# Patient Record
Sex: FEMALE | Race: BLACK OR AFRICAN AMERICAN | NOT HISPANIC OR LATINO | Employment: UNEMPLOYED | URBAN - METROPOLITAN AREA
[De-identification: names, ages, dates, MRNs, and addresses within clinical notes are randomized per-mention and may not be internally consistent; named-entity substitution may affect disease eponyms.]

---

## 2020-02-24 ENCOUNTER — HOSPITAL ENCOUNTER (EMERGENCY)
Facility: HOSPITAL | Age: 25
Discharge: HOME | End: 2020-02-24
Attending: EMERGENCY MEDICINE | Admitting: EMERGENCY MEDICINE

## 2020-02-24 ENCOUNTER — APPOINTMENT (OUTPATIENT)
Dept: CT IMAGING | Facility: HOSPITAL | Age: 25
End: 2020-02-24

## 2020-02-24 VITALS
OXYGEN SATURATION: 100 % | DIASTOLIC BLOOD PRESSURE: 69 MMHG | WEIGHT: 200 LBS | SYSTOLIC BLOOD PRESSURE: 109 MMHG | BODY MASS INDEX: 35.44 KG/M2 | HEIGHT: 63 IN | TEMPERATURE: 98 F | RESPIRATION RATE: 16 BRPM | HEART RATE: 64 BPM

## 2020-02-24 DIAGNOSIS — R10.84 GENERALIZED ABDOMINAL PAIN: Primary | ICD-10-CM

## 2020-02-24 DIAGNOSIS — K59.00 CONSTIPATION, UNSPECIFIED CONSTIPATION TYPE: ICD-10-CM

## 2020-02-24 LAB
ALBUMIN SERPL-MCNC: 4.3 G/DL (ref 3.5–5.2)
ALBUMIN/GLOB SERPL: 1.2 G/DL
ALP SERPL-CCNC: 60 U/L (ref 39–117)
ALT SERPL W P-5'-P-CCNC: 6 U/L (ref 1–33)
ANION GAP SERPL CALCULATED.3IONS-SCNC: 11 MMOL/L (ref 5–15)
AST SERPL-CCNC: 13 U/L (ref 1–32)
BACTERIA UR QL AUTO: NORMAL /HPF
BASOPHILS # BLD AUTO: 0.02 10*3/MM3 (ref 0–0.2)
BASOPHILS NFR BLD AUTO: 0.5 % (ref 0–1.5)
BILIRUB SERPL-MCNC: 0.4 MG/DL (ref 0.2–1.2)
BILIRUB UR QL STRIP: NEGATIVE
BUN BLD-MCNC: 7 MG/DL (ref 6–20)
BUN/CREAT SERPL: 10.8 (ref 7–25)
CALCIUM SPEC-SCNC: 9.4 MG/DL (ref 8.6–10.5)
CHLORIDE SERPL-SCNC: 103 MMOL/L (ref 98–107)
CLARITY UR: CLEAR
CLUE CELLS SPEC QL WET PREP: NORMAL
CO2 SERPL-SCNC: 24 MMOL/L (ref 22–29)
COLOR UR: YELLOW
CREAT BLD-MCNC: 0.65 MG/DL (ref 0.57–1)
DEPRECATED RDW RBC AUTO: 42.5 FL (ref 37–54)
EOSINOPHIL # BLD AUTO: 0.03 10*3/MM3 (ref 0–0.4)
EOSINOPHIL NFR BLD AUTO: 0.7 % (ref 0.3–6.2)
ERYTHROCYTE [DISTWIDTH] IN BLOOD BY AUTOMATED COUNT: 14.5 % (ref 12.3–15.4)
GFR SERPL CREATININE-BSD FRML MDRD: 137 ML/MIN/1.73
GLOBULIN UR ELPH-MCNC: 3.5 GM/DL
GLUCOSE BLD-MCNC: 96 MG/DL (ref 65–99)
GLUCOSE UR STRIP-MCNC: NEGATIVE MG/DL
HCG INTACT+B SERPL-ACNC: <0.5 MIU/ML
HCT VFR BLD AUTO: 37.5 % (ref 34–46.6)
HGB BLD-MCNC: 11.8 G/DL (ref 12–15.9)
HGB UR QL STRIP.AUTO: NEGATIVE
HYALINE CASTS UR QL AUTO: NORMAL /LPF
HYDATID CYST SPEC WET PREP: NORMAL
IMM GRANULOCYTES # BLD AUTO: 0 10*3/MM3 (ref 0–0.05)
IMM GRANULOCYTES NFR BLD AUTO: 0 % (ref 0–0.5)
KETONES UR QL STRIP: NEGATIVE
KOH PREP NAIL: NORMAL
LEUKOCYTE ESTERASE UR QL STRIP.AUTO: ABNORMAL
LYMPHOCYTES # BLD AUTO: 1.08 10*3/MM3 (ref 0.7–3.1)
LYMPHOCYTES NFR BLD AUTO: 24.7 % (ref 19.6–45.3)
MCH RBC QN AUTO: 25.7 PG (ref 26.6–33)
MCHC RBC AUTO-ENTMCNC: 31.5 G/DL (ref 31.5–35.7)
MCV RBC AUTO: 81.7 FL (ref 79–97)
MONOCYTES # BLD AUTO: 0.3 10*3/MM3 (ref 0.1–0.9)
MONOCYTES NFR BLD AUTO: 6.9 % (ref 5–12)
NEUTROPHILS # BLD AUTO: 2.94 10*3/MM3 (ref 1.7–7)
NEUTROPHILS NFR BLD AUTO: 67.2 % (ref 42.7–76)
NITRITE UR QL STRIP: NEGATIVE
NRBC BLD AUTO-RTO: 0 /100 WBC (ref 0–0.2)
PH UR STRIP.AUTO: 8.5 [PH] (ref 5–8)
PLATELET # BLD AUTO: 226 10*3/MM3 (ref 140–450)
PMV BLD AUTO: 9.8 FL (ref 6–12)
POTASSIUM BLD-SCNC: 4.2 MMOL/L (ref 3.5–5.2)
PROT SERPL-MCNC: 7.8 G/DL (ref 6–8.5)
PROT UR QL STRIP: NEGATIVE
RBC # BLD AUTO: 4.59 10*6/MM3 (ref 3.77–5.28)
RBC # UR: NORMAL /HPF
REF LAB TEST METHOD: NORMAL
SODIUM BLD-SCNC: 138 MMOL/L (ref 136–145)
SP GR UR STRIP: 1.01 (ref 1–1.03)
SQUAMOUS #/AREA URNS HPF: NORMAL /HPF
T VAGINALIS SPEC QL WET PREP: NORMAL
UROBILINOGEN UR QL STRIP: ABNORMAL
WBC NRBC COR # BLD: 4.37 10*3/MM3 (ref 3.4–10.8)
WBC SPEC QL WET PREP: NORMAL
WBC UR QL AUTO: NORMAL /HPF
YEAST GENITAL QL WET PREP: NORMAL

## 2020-02-24 PROCEDURE — 87210 SMEAR WET MOUNT SALINE/INK: CPT | Performed by: EMERGENCY MEDICINE

## 2020-02-24 PROCEDURE — 99284 EMERGENCY DEPT VISIT MOD MDM: CPT

## 2020-02-24 PROCEDURE — 87220 TISSUE EXAM FOR FUNGI: CPT | Performed by: EMERGENCY MEDICINE

## 2020-02-24 PROCEDURE — 85025 COMPLETE CBC W/AUTO DIFF WBC: CPT | Performed by: EMERGENCY MEDICINE

## 2020-02-24 PROCEDURE — 74176 CT ABD & PELVIS W/O CONTRAST: CPT

## 2020-02-24 PROCEDURE — 84702 CHORIONIC GONADOTROPIN TEST: CPT | Performed by: EMERGENCY MEDICINE

## 2020-02-24 PROCEDURE — 87491 CHLMYD TRACH DNA AMP PROBE: CPT | Performed by: EMERGENCY MEDICINE

## 2020-02-24 PROCEDURE — 87591 N.GONORRHOEAE DNA AMP PROB: CPT | Performed by: EMERGENCY MEDICINE

## 2020-02-24 PROCEDURE — 81001 URINALYSIS AUTO W/SCOPE: CPT | Performed by: EMERGENCY MEDICINE

## 2020-02-24 PROCEDURE — 80053 COMPREHEN METABOLIC PANEL: CPT | Performed by: EMERGENCY MEDICINE

## 2020-02-24 RX ORDER — SODIUM CHLORIDE 0.9 % (FLUSH) 0.9 %
10 SYRINGE (ML) INJECTION AS NEEDED
Status: DISCONTINUED | OUTPATIENT
Start: 2020-02-24 | End: 2020-02-24 | Stop reason: HOSPADM

## 2020-02-24 RX ORDER — ACETAMINOPHEN 500 MG
1000 TABLET ORAL ONCE
Status: DISCONTINUED | OUTPATIENT
Start: 2020-02-24 | End: 2020-02-24

## 2020-02-24 RX ORDER — MAGNESIUM CARB/ALUMINUM HYDROX 105-160MG
296 TABLET,CHEWABLE ORAL ONCE
Status: DISCONTINUED | OUTPATIENT
Start: 2020-02-24 | End: 2020-02-24

## 2020-02-24 RX ORDER — DICYCLOMINE HYDROCHLORIDE 10 MG/1
20 CAPSULE ORAL ONCE
Status: DISCONTINUED | OUTPATIENT
Start: 2020-02-24 | End: 2020-02-24

## 2020-02-24 NOTE — ED PROVIDER NOTES
Subjective   Bailee Lora is a 24 y.o.female who presents to the emergency department with complaints of vaginal pain. The patient reports she believes she is pregnant, despite presenting to her OBGYN prior to her presentation to the ED where she was told she was not pregnant. She states she believes she is pregnant due to constant vaginal pain described as aching for one week, and that this is similar to the sensation she experienced during a previous pregnancy one year ago. She endorses vaginal bleeding secondary to her menstrual cycle which ended yesterday and was normal. Associated symptoms include nausea, abdominal pain, constipation, and cough. Her last bowel movement was two days ago and occurred easily, however she states this is atypical for her as she usually experiences a bowel movement daily. She denies vomiting, diarrhea, dysuria, urinary frequency, fever, and rash. The patient has a medical history of Lupus. Her social history includes recreational marijuana use, but she denies alcohol and tobacco use. There are no other acute complaints at this time.      History provided by:  Patient  Pelvic Pain   Location:  Vaginal pain  Severity:  Moderate  Onset quality:  Sudden  Duration:  1 week  Timing:  Constant  Progression:  Unchanged  Chronicity:  Recurrent  Context:  Experienced this pain during her previous pregnancy  Associated symptoms: abdominal pain, cough and nausea    Associated symptoms: no diarrhea, no fever, no rash and no vomiting        Review of Systems   Constitutional: Negative for fever.   Respiratory: Positive for cough.    Gastrointestinal: Positive for abdominal pain, constipation and nausea. Negative for diarrhea and vomiting.   Genitourinary: Positive for pelvic pain, vaginal bleeding (resolved) and vaginal pain. Negative for dysuria, frequency and menstrual problem.   Skin: Negative for rash.   All other systems reviewed and are negative.      Past Medical History:   Diagnosis  Date   • Lupus (CMS/HCC)    • Vaginal delivery        Allergies   Allergen Reactions   • Amoxicillin    • Biaxin [Clarithromycin]    • Erythromycin    • Penicillins        Past Surgical History:   Procedure Laterality Date   • DILATATION AND CURETTAGE     • INNER EAR SURGERY     • MOUTH SURGERY         History reviewed. No pertinent family history.    Social History     Socioeconomic History   • Marital status: Single     Spouse name: Not on file   • Number of children: Not on file   • Years of education: Not on file   • Highest education level: Not on file   Tobacco Use   • Smoking status: Never Smoker   Substance and Sexual Activity   • Alcohol use: No   • Drug use: Yes     Types: Marijuana         Objective   Physical Exam   Constitutional: She is oriented to person, place, and time. She appears well-developed and well-nourished. No distress.   HENT:   Head: Normocephalic and atraumatic.   Eyes: Conjunctivae are normal. No scleral icterus.   Neck: Normal range of motion. Neck supple.   Cardiovascular: Normal rate, regular rhythm, normal heart sounds and intact distal pulses.   Pulmonary/Chest: Effort normal and breath sounds normal. No respiratory distress.   Abdominal: Soft. Bowel sounds are normal. There is tenderness. There is no CVA tenderness.   Patient with no response to abdominal exam stethoscope and palpation but then when asked about tenderness states it was tender. No CVA tenderness   Genitourinary: Vagina normal. There is no rash or lesion on the right labia. There is no rash or lesion on the left labia. Cervix exhibits no motion tenderness and no discharge. No bleeding in the vagina. No foreign body in the vagina. No signs of injury around the vagina. No vaginal discharge found.   Musculoskeletal: Normal range of motion.   Neurological: She is alert and oriented to person, place, and time.   Skin: Skin is warm and dry.   Psychiatric: She has a normal mood and affect. Her behavior is normal.   Nursing  note and vitals reviewed.      Procedures         ED Course  ED Course as of Feb 24 1526   Mon Feb 24, 2020   0914 The patient was brought back to the room with concern for possible contractions in pregnancy.  I did a quick bedside screening ultrasound which demonstrated no evidence of intrauterine pregnancy.  Patient had a distended bladder and a large uterus but no intrauterine pregnancy was visualized.    [RS]   1007 HCG Quantitative: <0.50 [RS]   1121 I advised the patient on the findings of her lab and imaging studies.  No emergent or surgical findings.  Patient does have moderate constipation and fecal stasis.  I talked with the patient about resolution of these but the patient is refusing any medications for this.  I discussed that they could help with her symptoms and help her feel better.  However, the patient continues to refuse.  I advised her to drink plenty of fluids for hydration and to increase bowel regularity. I had a discussion with the patient/family regarding diagnosis, diagnostic results, treatment plan, and medications.  The patient/family indicated understanding of these instructions.  I spent adequate time at the bedside proceeding discharge necessary to personally discuss the aftercare instructions, giving patient education, providing explanations of the results of our evaluations/findings, and my decision making to assure that the patient/family understand the plan of care.  Time was allotted to answer questions at that time and throughout the ED course.  Emphasis was placed on timely follow-up after discharge.  I also discussed the potential for the development of an acute emergent condition requiring further evaluation, admission, or even surgical intervention. I discussed that we found nothing during the visit today indicating the need for further workup, admission, or the presence of an unstable medical condition.  I encouraged the patient to return to the emergency department  immediately for ANY concerns, worsening, new complaints, or if symptoms persist and unable to seek follow-up in a timely fashion.  The patient/family expressed understanding and agreement with this plan.     [RS]      ED Course User Index  [RS] Sami Araya MD     Recent Results (from the past 24 hour(s))   Comprehensive Metabolic Panel    Collection Time: 02/24/20  9:23 AM   Result Value Ref Range    Glucose 96 65 - 99 mg/dL    BUN 7 6 - 20 mg/dL    Creatinine 0.65 0.57 - 1.00 mg/dL    Sodium 138 136 - 145 mmol/L    Potassium 4.2 3.5 - 5.2 mmol/L    Chloride 103 98 - 107 mmol/L    CO2 24.0 22.0 - 29.0 mmol/L    Calcium 9.4 8.6 - 10.5 mg/dL    Total Protein 7.8 6.0 - 8.5 g/dL    Albumin 4.30 3.50 - 5.20 g/dL    ALT (SGPT) 6 1 - 33 U/L    AST (SGOT) 13 1 - 32 U/L    Alkaline Phosphatase 60 39 - 117 U/L    Total Bilirubin 0.4 0.2 - 1.2 mg/dL    eGFR  African Amer 137 >60 mL/min/1.73    Globulin 3.5 gm/dL    A/G Ratio 1.2 g/dL    BUN/Creatinine Ratio 10.8 7.0 - 25.0    Anion Gap 11.0 5.0 - 15.0 mmol/L   hCG, Quantitative, Pregnancy    Collection Time: 02/24/20  9:23 AM   Result Value Ref Range    HCG Quantitative <0.50 mIU/mL   Urinalysis With Culture If Indicated - Urine, Clean Catch    Collection Time: 02/24/20  9:23 AM   Result Value Ref Range    Color, UA Yellow Yellow, Straw    Appearance, UA Clear Clear    pH, UA 8.5 (H) 5.0 - 8.0    Specific Gravity, UA 1.007 1.001 - 1.030    Glucose, UA Negative Negative    Ketones, UA Negative Negative    Bilirubin, UA Negative Negative    Blood, UA Negative Negative    Protein, UA Negative Negative    Leuk Esterase, UA Trace (A) Negative    Nitrite, UA Negative Negative    Urobilinogen, UA 0.2 E.U./dL 0.2 - 1.0 E.U./dL   CBC Auto Differential    Collection Time: 02/24/20  9:23 AM   Result Value Ref Range    WBC 4.37 3.40 - 10.80 10*3/mm3    RBC 4.59 3.77 - 5.28 10*6/mm3    Hemoglobin 11.8 (L) 12.0 - 15.9 g/dL    Hematocrit 37.5 34.0 - 46.6 %    MCV 81.7 79.0 - 97.0  fL    MCH 25.7 (L) 26.6 - 33.0 pg    MCHC 31.5 31.5 - 35.7 g/dL    RDW 14.5 12.3 - 15.4 %    RDW-SD 42.5 37.0 - 54.0 fl    MPV 9.8 6.0 - 12.0 fL    Platelets 226 140 - 450 10*3/mm3    Neutrophil % 67.2 42.7 - 76.0 %    Lymphocyte % 24.7 19.6 - 45.3 %    Monocyte % 6.9 5.0 - 12.0 %    Eosinophil % 0.7 0.3 - 6.2 %    Basophil % 0.5 0.0 - 1.5 %    Immature Grans % 0.0 0.0 - 0.5 %    Neutrophils, Absolute 2.94 1.70 - 7.00 10*3/mm3    Lymphocytes, Absolute 1.08 0.70 - 3.10 10*3/mm3    Monocytes, Absolute 0.30 0.10 - 0.90 10*3/mm3    Eosinophils, Absolute 0.03 0.00 - 0.40 10*3/mm3    Basophils, Absolute 0.02 0.00 - 0.20 10*3/mm3    Immature Grans, Absolute 0.00 0.00 - 0.05 10*3/mm3    nRBC 0.0 0.0 - 0.2 /100 WBC   Urinalysis, Microscopic Only - Urine, Clean Catch    Collection Time: 02/24/20  9:23 AM   Result Value Ref Range    RBC, UA 0-2 None Seen, 0-2 /HPF    WBC, UA 0-2 None Seen, 0-2 /HPF    Bacteria, UA None Seen None Seen, Trace /HPF    Squamous Epithelial Cells, UA 0-2 None Seen, 0-2 /HPF    Hyaline Casts, UA None Seen 0 - 6 /LPF    Methodology Automated Microscopy    CHECO Prep - Swab, Vagina    Collection Time: 02/24/20  9:35 AM   Result Value Ref Range    KOH Prep No yeast or hyphal elements seen No yeast or hyphal elements seen   Wet Prep, Genital - Swab, Vagina    Collection Time: 02/24/20  9:35 AM   Result Value Ref Range    YEAST No yeast seen No yeast seen    HYPHAL ELEMENTS No Hyphal elements seen No Hyphal elements seen    WBC'S No WBC's seen No WBC's seen    Clue Cells, Wet Prep No Clue cells seen No Clue cells seen    Trichomonas, Wet Prep No Trichomonas seen No Trichomonas seen     Note: In addition to lab results from this visit, the labs listed above may include labs taken at another facility or during a different encounter within the last 24 hours. Please correlate lab times with ED admission and discharge times for further clarification of the services performed during this visit.    CT Abdomen  "Pelvis Without Contrast   Preliminary Result   1.  Mild-to-moderate fecal stasis.   2.  No evidence of acute inflammatory process or other clearly acute   intra-abdominal or intrapelvic disease is seen. If the patient's   symptoms persist or worsen, consider followup with oral and IV contrast   enhanced scan.       D:  02/24/2020   E:  02/24/2020                Vitals:    02/24/20 0914 02/24/20 0937 02/24/20 1000 02/24/20 1030   BP: 132/81 121/79  109/69   Pulse: 71  65 64   Resp: 16      Temp: 98 °F (36.7 °C)      TempSrc: Oral      SpO2: 100%  98% 100%   Weight: 90.7 kg (200 lb)      Height: 160 cm (63\")        Medications - No data to display  ECG/EMG Results (last 24 hours)     ** No results found for the last 24 hours. **        No orders to display                                                  MDM  Number of Diagnoses or Management Options  Constipation, unspecified constipation type:   Generalized abdominal pain:      Amount and/or Complexity of Data Reviewed  Clinical lab tests: reviewed  Tests in the radiology section of CPT®: reviewed  Independent visualization of images, tracings, or specimens: yes        Final diagnoses:   Generalized abdominal pain   Constipation, unspecified constipation type       Documentation assistance provided by camila Kim.  Information recorded by the scribe was done at my direction and has been verified and validated by me.     Royce Kim  02/24/20 1129       Sami Araya MD  02/24/20 1527    "

## 2020-02-25 LAB
C TRACH RRNA SPEC DONR QL NAA+PROBE: NEGATIVE
N GONORRHOEA DNA SPEC QL NAA+PROBE: NEGATIVE

## 2023-09-14 ENCOUNTER — APPOINTMENT (OUTPATIENT)
Dept: CT IMAGING | Facility: HOSPITAL | Age: 28
End: 2023-09-14
Payer: MEDICAID

## 2023-09-14 ENCOUNTER — HOSPITAL ENCOUNTER (EMERGENCY)
Facility: HOSPITAL | Age: 28
Discharge: HOME | End: 2023-09-14
Attending: EMERGENCY MEDICINE
Payer: MEDICAID

## 2023-09-14 VITALS
OXYGEN SATURATION: 100 % | BODY MASS INDEX: 36.32 KG/M2 | TEMPERATURE: 98.5 F | RESPIRATION RATE: 18 BRPM | SYSTOLIC BLOOD PRESSURE: 103 MMHG | HEART RATE: 78 BPM | DIASTOLIC BLOOD PRESSURE: 71 MMHG | HEIGHT: 63 IN | WEIGHT: 205 LBS

## 2023-09-14 DIAGNOSIS — K59.00 CONSTIPATION, UNSPECIFIED CONSTIPATION TYPE: Primary | ICD-10-CM

## 2023-09-14 DIAGNOSIS — N39.0 URINARY TRACT INFECTION WITHOUT HEMATURIA, SITE UNSPECIFIED: ICD-10-CM

## 2023-09-14 LAB
ALBUMIN SERPL-MCNC: 3.9 G/DL (ref 3.5–5.2)
ALBUMIN/GLOB SERPL: 1.1 G/DL
ALP SERPL-CCNC: 59 U/L (ref 39–117)
ALT SERPL W P-5'-P-CCNC: 7 U/L (ref 1–33)
AMPHET+METHAMPHET UR QL: NEGATIVE
AMPHETAMINES UR QL: NEGATIVE
ANION GAP SERPL CALCULATED.3IONS-SCNC: 8 MMOL/L (ref 5–15)
AST SERPL-CCNC: 16 U/L (ref 1–32)
BACTERIA UR QL AUTO: ABNORMAL /HPF
BARBITURATES UR QL SCN: NEGATIVE
BASOPHILS # BLD AUTO: 0.02 10*3/MM3 (ref 0–0.2)
BASOPHILS NFR BLD AUTO: 0.4 % (ref 0–1.5)
BENZODIAZ UR QL SCN: NEGATIVE
BILIRUB SERPL-MCNC: 0.2 MG/DL (ref 0–1.2)
BILIRUB UR QL STRIP: NEGATIVE
BUN SERPL-MCNC: 7 MG/DL (ref 6–20)
BUN/CREAT SERPL: 8.6 (ref 7–25)
BUPRENORPHINE SERPL-MCNC: NEGATIVE NG/ML
CALCIUM SPEC-SCNC: 8.5 MG/DL (ref 8.6–10.5)
CANNABINOIDS SERPL QL: NEGATIVE
CHLORIDE SERPL-SCNC: 107 MMOL/L (ref 98–107)
CLARITY UR: ABNORMAL
CO2 SERPL-SCNC: 27 MMOL/L (ref 22–29)
COCAINE UR QL: NEGATIVE
COLOR UR: YELLOW
CREAT SERPL-MCNC: 0.81 MG/DL (ref 0.57–1)
DEPRECATED RDW RBC AUTO: 43.4 FL (ref 37–54)
EGFRCR SERPLBLD CKD-EPI 2021: 102.2 ML/MIN/1.73
EOSINOPHIL # BLD AUTO: 0.06 10*3/MM3 (ref 0–0.4)
EOSINOPHIL NFR BLD AUTO: 1.3 % (ref 0.3–6.2)
ERYTHROCYTE [DISTWIDTH] IN BLOOD BY AUTOMATED COUNT: 16.8 % (ref 12.3–15.4)
FENTANYL UR-MCNC: NEGATIVE NG/ML
GLOBULIN UR ELPH-MCNC: 3.7 GM/DL
GLUCOSE SERPL-MCNC: 89 MG/DL (ref 65–99)
GLUCOSE UR STRIP-MCNC: NEGATIVE MG/DL
HCG SERPL QL: NEGATIVE
HCT VFR BLD AUTO: 32.1 % (ref 34–46.6)
HGB BLD-MCNC: 9.7 G/DL (ref 12–15.9)
HGB UR QL STRIP.AUTO: NEGATIVE
HYALINE CASTS UR QL AUTO: ABNORMAL /LPF
IMM GRANULOCYTES # BLD AUTO: 0.01 10*3/MM3 (ref 0–0.05)
IMM GRANULOCYTES NFR BLD AUTO: 0.2 % (ref 0–0.5)
KETONES UR QL STRIP: NEGATIVE
LEUKOCYTE ESTERASE UR QL STRIP.AUTO: ABNORMAL
LIPASE SERPL-CCNC: 29 U/L (ref 13–60)
LYMPHOCYTES # BLD AUTO: 1.78 10*3/MM3 (ref 0.7–3.1)
LYMPHOCYTES NFR BLD AUTO: 38.8 % (ref 19.6–45.3)
MCH RBC QN AUTO: 22 PG (ref 26.6–33)
MCHC RBC AUTO-ENTMCNC: 30.2 G/DL (ref 31.5–35.7)
MCV RBC AUTO: 72.8 FL (ref 79–97)
METHADONE UR QL SCN: NEGATIVE
MONOCYTES # BLD AUTO: 0.27 10*3/MM3 (ref 0.1–0.9)
MONOCYTES NFR BLD AUTO: 5.9 % (ref 5–12)
NEUTROPHILS NFR BLD AUTO: 2.45 10*3/MM3 (ref 1.7–7)
NEUTROPHILS NFR BLD AUTO: 53.4 % (ref 42.7–76)
NITRITE UR QL STRIP: NEGATIVE
NRBC BLD AUTO-RTO: 0 /100 WBC (ref 0–0.2)
OPIATES UR QL: NEGATIVE
OXYCODONE UR QL SCN: NEGATIVE
PCP UR QL SCN: NEGATIVE
PH UR STRIP.AUTO: 8 [PH] (ref 5–9)
PLATELET # BLD AUTO: 183 10*3/MM3 (ref 140–450)
PMV BLD AUTO: 10.2 FL (ref 6–12)
POTASSIUM SERPL-SCNC: 4 MMOL/L (ref 3.5–5.2)
PROPOXYPH UR QL: NEGATIVE
PROT SERPL-MCNC: 7.6 G/DL (ref 6–8.5)
PROT UR QL STRIP: NEGATIVE
RBC # BLD AUTO: 4.41 10*6/MM3 (ref 3.77–5.28)
RBC # UR STRIP: ABNORMAL /HPF
REF LAB TEST METHOD: ABNORMAL
SODIUM SERPL-SCNC: 142 MMOL/L (ref 136–145)
SP GR UR STRIP: 1.02 (ref 1–1.03)
SQUAMOUS #/AREA URNS HPF: ABNORMAL /HPF
TRICYCLICS UR QL SCN: NEGATIVE
UROBILINOGEN UR QL STRIP: ABNORMAL
WBC # UR STRIP: ABNORMAL /HPF
WBC NRBC COR # BLD: 4.59 10*3/MM3 (ref 3.4–10.8)

## 2023-09-14 PROCEDURE — 74177 CT ABD & PELVIS W/CONTRAST: CPT

## 2023-09-14 PROCEDURE — 99285 EMERGENCY DEPT VISIT HI MDM: CPT

## 2023-09-14 PROCEDURE — 25510000001 IOPAMIDOL 61 % SOLUTION: Performed by: EMERGENCY MEDICINE

## 2023-09-14 PROCEDURE — 81001 URINALYSIS AUTO W/SCOPE: CPT

## 2023-09-14 PROCEDURE — 84703 CHORIONIC GONADOTROPIN ASSAY: CPT

## 2023-09-14 PROCEDURE — 80053 COMPREHEN METABOLIC PANEL: CPT

## 2023-09-14 PROCEDURE — 85025 COMPLETE CBC W/AUTO DIFF WBC: CPT

## 2023-09-14 PROCEDURE — 83690 ASSAY OF LIPASE: CPT

## 2023-09-14 PROCEDURE — 80307 DRUG TEST PRSMV CHEM ANLYZR: CPT

## 2023-09-14 RX ORDER — POLYETHYLENE GLYCOL 3350 17 G/17G
17 POWDER, FOR SOLUTION ORAL DAILY
Qty: 5 PACKET | Refills: 0 | Status: SHIPPED | OUTPATIENT
Start: 2023-09-14 | End: 2023-09-21 | Stop reason: HOSPADM

## 2023-09-14 RX ORDER — SODIUM CHLORIDE 0.9 % (FLUSH) 0.9 %
10 SYRINGE (ML) INJECTION AS NEEDED
Status: DISCONTINUED | OUTPATIENT
Start: 2023-09-14 | End: 2023-09-14 | Stop reason: HOSPADM

## 2023-09-14 RX ORDER — NITROFURANTOIN 25; 75 MG/1; MG/1
100 CAPSULE ORAL 2 TIMES DAILY
Qty: 14 CAPSULE | Refills: 0 | Status: SHIPPED | OUTPATIENT
Start: 2023-09-14 | End: 2023-09-21 | Stop reason: HOSPADM

## 2023-09-14 RX ADMIN — IOPAMIDOL 90 ML: 612 INJECTION, SOLUTION INTRAVENOUS at 20:59

## 2023-09-15 NOTE — ED PROVIDER NOTES
Subjective   History of Present Illness  28 year old female patient presents to ER for complaint of constipation for past 2 days, feeling of bloating with flatulence since onset, and epigastric pain. She is rating pain 7/10 presently. She also reports that she has lost almost 50 lbs over the past 84 days unintentionally. She denies changes in her diet or medications. She denies nausea, vomiting, urinary symptoms, or fever. LMP 8/27. She has a history of lupus and has been at [facility] for past 3 days after detoxing from marijuana. 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 She denies tobacco use, ETOH use, or present illicit drug use for past month.     Review of Systems   Constitutional:  Positive for unexpected weight change. Negative for fever.   HENT: Negative.     Eyes: Negative.    Respiratory: Negative.     Cardiovascular: Negative.    Gastrointestinal:  Positive for abdominal distention, abdominal pain and constipation. Negative for diarrhea, nausea and vomiting.   Endocrine: Negative.    Genitourinary: Negative.  Negative for dysuria and urgency.   Musculoskeletal: Negative.    Skin: Negative.    Allergic/Immunologic: Negative.    Neurological: Negative.    Hematological: Negative.    Psychiatric/Behavioral: Negative.       Past Medical History:   Diagnosis Date    Lupus     Vaginal delivery        Allergies   Allergen Reactions    Amoxicillin     Biaxin [Clarithromycin]     Erythromycin     Penicillins        Past Surgical History:   Procedure Laterality Date    DILATATION AND CURETTAGE      INNER EAR SURGERY      MOUTH SURGERY         History reviewed. No pertinent family history.    Social History     Socioeconomic History    Marital status: Single   Tobacco Use    Smoking status: Never   Substance and Sexual Activity    Alcohol use: No    Drug use: Yes     Types: Marijuana           Objective   /78   Pulse 78   Temp 98.5 °F (36.9 °C) (Oral)   Resp 18   Ht  "160 cm (63\")   Wt 93 kg (205 lb)   LMP 08/27/2023 (Exact Date)   SpO2 98%   BMI 36.31 kg/m²     Physical Exam  Vitals and nursing note reviewed.   Constitutional:       General: She is not in acute distress.     Appearance: Normal appearance. She is not ill-appearing, toxic-appearing or diaphoretic.   HENT:      Head: Normocephalic.      Nose: Nose normal.      Mouth/Throat:      Mouth: Mucous membranes are moist.   Eyes:      Pupils: Pupils are equal, round, and reactive to light.   Cardiovascular:      Rate and Rhythm: Normal rate and regular rhythm.      Pulses: Normal pulses.      Heart sounds: Normal heart sounds.   Pulmonary:      Effort: Pulmonary effort is normal. No respiratory distress.      Breath sounds: Normal breath sounds. No stridor. No wheezing or rhonchi.   Abdominal:      General: Bowel sounds are normal.      Palpations: Abdomen is soft.      Tenderness: There is generalized abdominal tenderness and tenderness in the epigastric area and suprapubic area.   Musculoskeletal:         General: Normal range of motion.      Cervical back: Normal range of motion.   Skin:     General: Skin is warm and dry.      Capillary Refill: Capillary refill takes less than 2 seconds.   Neurological:      Mental Status: She is alert and oriented to person, place, and time.   Psychiatric:         Mood and Affect: Mood normal.         Behavior: Behavior normal.         Thought Content: Thought content normal.         Judgment: Judgment normal.       Procedures           ED Course  ED Course as of 09/14/23 2121   Thu Sep 14, 2023   2101 Urinalysis With Microscopic If Indicated (No Culture) - Urine, Clean Catch(!) [HS]   2116 Spoke with patient about her results and plan for discharge including abx for UTI, miralax for constipation, and follow up with FP for reevaluation of anemia. She verbalizes understanding and is agreeable with plan. [HS]      ED Course User Index  [HS] Suzy Fuentes, CLARISSE           Results " for orders placed or performed during the hospital encounter of 09/14/23   Comprehensive Metabolic Panel    Specimen: Blood   Result Value Ref Range    Glucose 89 65 - 99 mg/dL    BUN 7 6 - 20 mg/dL    Creatinine 0.81 0.57 - 1.00 mg/dL    Sodium 142 136 - 145 mmol/L    Potassium 4.0 3.5 - 5.2 mmol/L    Chloride 107 98 - 107 mmol/L    CO2 27.0 22.0 - 29.0 mmol/L    Calcium 8.5 (L) 8.6 - 10.5 mg/dL    Total Protein 7.6 6.0 - 8.5 g/dL    Albumin 3.9 3.5 - 5.2 g/dL    ALT (SGPT) 7 1 - 33 U/L    AST (SGOT) 16 1 - 32 U/L    Alkaline Phosphatase 59 39 - 117 U/L    Total Bilirubin 0.2 0.0 - 1.2 mg/dL    Globulin 3.7 gm/dL    A/G Ratio 1.1 g/dL    BUN/Creatinine Ratio 8.6 7.0 - 25.0    Anion Gap 8.0 5.0 - 15.0 mmol/L    eGFR 102.2 >60.0 mL/min/1.73   Lipase    Specimen: Blood   Result Value Ref Range    Lipase 29 13 - 60 U/L   hCG, Serum, Qualitative    Specimen: Blood   Result Value Ref Range    HCG Qualitative Negative Negative   Urinalysis With Microscopic If Indicated (No Culture) - Urine, Clean Catch    Specimen: Urine, Clean Catch   Result Value Ref Range    Color, UA Yellow Yellow, Straw, Dark Yellow, Chika    Appearance, UA Cloudy (A) Clear    pH, UA 8.0 5.0 - 9.0    Specific Gravity, UA 1.023 1.003 - 1.030    Glucose, UA Negative Negative    Ketones, UA Negative Negative    Bilirubin, UA Negative Negative    Blood, UA Negative Negative    Protein, UA Negative Negative    Leuk Esterase, UA Large (3+) (A) Negative    Nitrite, UA Negative Negative    Urobilinogen, UA 1.0 E.U./dL 0.2 - 1.0 E.U./dL   Urine Drug Screen - Urine, Clean Catch    Specimen: Urine, Clean Catch   Result Value Ref Range    THC, Screen, Urine Negative Negative    Phencyclidine (PCP), Urine Negative Negative    Cocaine Screen, Urine Negative Negative    Methamphetamine, Ur Negative Negative    Opiate Screen Negative Negative    Amphetamine Screen, Urine Negative Negative    Benzodiazepine Screen, Urine Negative Negative    Tricyclic Antidepressants  Screen Negative Negative    Methadone Screen, Urine Negative Negative    Barbiturates Screen, Urine Negative Negative    Oxycodone Screen, Urine Negative Negative    Propoxyphene Screen Negative Negative    Buprenorphine, Screen, Urine Negative Negative   CBC Auto Differential    Specimen: Blood   Result Value Ref Range    WBC 4.59 3.40 - 10.80 10*3/mm3    RBC 4.41 3.77 - 5.28 10*6/mm3    Hemoglobin 9.7 (L) 12.0 - 15.9 g/dL    Hematocrit 32.1 (L) 34.0 - 46.6 %    MCV 72.8 (L) 79.0 - 97.0 fL    MCH 22.0 (L) 26.6 - 33.0 pg    MCHC 30.2 (L) 31.5 - 35.7 g/dL    RDW 16.8 (H) 12.3 - 15.4 %    RDW-SD 43.4 37.0 - 54.0 fl    MPV 10.2 6.0 - 12.0 fL    Platelets 183 140 - 450 10*3/mm3    Neutrophil % 53.4 42.7 - 76.0 %    Lymphocyte % 38.8 19.6 - 45.3 %    Monocyte % 5.9 5.0 - 12.0 %    Eosinophil % 1.3 0.3 - 6.2 %    Basophil % 0.4 0.0 - 1.5 %    Immature Grans % 0.2 0.0 - 0.5 %    Neutrophils, Absolute 2.45 1.70 - 7.00 10*3/mm3    Lymphocytes, Absolute 1.78 0.70 - 3.10 10*3/mm3    Monocytes, Absolute 0.27 0.10 - 0.90 10*3/mm3    Eosinophils, Absolute 0.06 0.00 - 0.40 10*3/mm3    Basophils, Absolute 0.02 0.00 - 0.20 10*3/mm3    Immature Grans, Absolute 0.01 0.00 - 0.05 10*3/mm3    nRBC 0.0 0.0 - 0.2 /100 WBC   Fentanyl, Urine - Urine, Clean Catch    Specimen: Urine, Clean Catch   Result Value Ref Range    Fentanyl, Urine Negative Negative   Urinalysis, Microscopic Only - Urine, Clean Catch    Specimen: Urine, Clean Catch   Result Value Ref Range    RBC, UA None Seen None Seen /HPF    WBC, UA 21-30 (A) None Seen, 0-2, 3-5 /HPF    Bacteria, UA 3+ (A) None Seen /HPF    Squamous Epithelial Cells, UA 6-12 (A) None Seen, 0-2 /HPF    Hyaline Casts, UA None Seen None Seen /LPF    Methodology Manual Light Microscopy      CT Abdomen Pelvis With Contrast    Result Date: 9/14/2023  Indication: Epigastric pain with constipation and unintentional weight loss TECHNIQUE: Intravenous contrast was administered and axial images from the level  of the diaphragms through the pelvis were performed followed by 2-D multiplanar reformats. Comparison: None FINDINGS: Streak artifact from a morbidly obese patient body habitus compromises evaluation diffusely.  Abdominal viscera are unremarkable.  No ascites or free air or bowel obstruction is seen.  Moderate to large fecal residue in the colon may be from constipation.  In the subcutaneous fat of the right buttock soft tissue thickening along with specks of air may be secondary to recent/recurrent subcutaneous injections.  Visualized lung bases and the bones show no significant abnormality.     No definite acute abnormality seen.                                     Medical Decision Making  Amount and/or Complexity of Data Reviewed  Labs: ordered.  Radiology: ordered.    Risk  Prescription drug management.        Final diagnoses:   Constipation, unspecified constipation type   Urinary tract infection without hematuria, site unspecified       ED Disposition  ED Disposition       ED Disposition   Discharge    Condition   Stable    Comment   --               BDM  RESIDENT [facility]  [address] [place] [address] [phone]  Call   ER follow up in 2-3 days for reevaluation of symptoms         Medication List        New Prescriptions      nitrofurantoin (macrocrystal-monohydrate) 100 MG capsule  Commonly known as: MACROBID  Take 1 capsule by mouth 2 (Two) Times a Day for 7 days.     polyethylene glycol 17 g packet  Commonly known as: MIRALAX  Take 17 g by mouth Daily for 5 days.               Where to Get Your Medications        These medications were sent to [place], [place] - [facility] [phone] [facility] [phone] [address] [address]      Phone: [phone]   nitrofurantoin (macrocrystal-monohydrate) 100 MG capsule  polyethylene glycol 17 g packet            Suzy Fuentes, APRN  09/14/23 [id number]

## 2023-09-15 NOTE — DISCHARGE INSTRUCTIONS
Home to rest. Increase fluids and fiber intake. Take antibiotics as prescribed. Take miralax for next 5 days as prescribed. Follow up with primary care provider for reevaluation of symptoms, may use number provided to establish care. Return to ER for worsening symptoms.

## 2023-09-16 ENCOUNTER — HOSPITAL ENCOUNTER (OUTPATIENT)
Facility: HOSPITAL | Age: 28
Setting detail: OBSERVATION
Discharge: HOME | End: 2023-09-21
Attending: STUDENT IN AN ORGANIZED HEALTH CARE EDUCATION/TRAINING PROGRAM | Admitting: INTERNAL MEDICINE

## 2023-09-16 DIAGNOSIS — R45.89 SUICIDAL BEHAVIOR WITHOUT ATTEMPTED SELF-INJURY: Primary | ICD-10-CM

## 2023-09-16 DIAGNOSIS — U07.1 COVID: ICD-10-CM

## 2023-09-16 DIAGNOSIS — R44.0 AUDITORY HALLUCINATION: ICD-10-CM

## 2023-09-16 LAB
ALBUMIN SERPL-MCNC: 4.3 G/DL (ref 3.5–5.2)
ALBUMIN/GLOB SERPL: 1.3 G/DL
ALP SERPL-CCNC: 70 U/L (ref 39–117)
ALT SERPL W P-5'-P-CCNC: 25 U/L (ref 1–33)
AMPHET+METHAMPHET UR QL: NEGATIVE
AMPHETAMINES UR QL: NEGATIVE
ANION GAP SERPL CALCULATED.3IONS-SCNC: 15 MMOL/L (ref 5–15)
APAP SERPL-MCNC: <5 MCG/ML (ref 0–30)
AST SERPL-CCNC: 48 U/L (ref 1–32)
BACTERIA UR QL AUTO: ABNORMAL /HPF
BARBITURATES UR QL SCN: NEGATIVE
BASOPHILS # BLD AUTO: 0.04 10*3/MM3 (ref 0–0.2)
BASOPHILS NFR BLD AUTO: 0.9 % (ref 0–1.5)
BENZODIAZ UR QL SCN: NEGATIVE
BILIRUB SERPL-MCNC: 0.3 MG/DL (ref 0–1.2)
BILIRUB UR QL STRIP: NEGATIVE
BUN SERPL-MCNC: 6 MG/DL (ref 6–20)
BUN/CREAT SERPL: 6.4 (ref 7–25)
BUPRENORPHINE SERPL-MCNC: NEGATIVE NG/ML
CALCIUM SPEC-SCNC: 8.7 MG/DL (ref 8.6–10.5)
CANNABINOIDS SERPL QL: NEGATIVE
CHLORIDE SERPL-SCNC: 101 MMOL/L (ref 98–107)
CLARITY UR: ABNORMAL
CO2 SERPL-SCNC: 21 MMOL/L (ref 22–29)
COCAINE UR QL: NEGATIVE
COLOR UR: YELLOW
CREAT SERPL-MCNC: 0.94 MG/DL (ref 0.57–1)
DEPRECATED RDW RBC AUTO: 42.5 FL (ref 37–54)
EGFRCR SERPLBLD CKD-EPI 2021: 85.5 ML/MIN/1.73
EOSINOPHIL # BLD AUTO: 0.05 10*3/MM3 (ref 0–0.4)
EOSINOPHIL NFR BLD AUTO: 1.1 % (ref 0.3–6.2)
ERYTHROCYTE [DISTWIDTH] IN BLOOD BY AUTOMATED COUNT: 16.6 % (ref 12.3–15.4)
ETHANOL BLD-MCNC: <10 MG/DL (ref 0–10)
ETHANOL UR QL: <0.01 %
FENTANYL UR-MCNC: NEGATIVE NG/ML
FINE GRAN CASTS URNS QL MICRO: ABNORMAL /LPF
FLUAV RNA RESP QL NAA+PROBE: NOT DETECTED
FLUBV RNA RESP QL NAA+PROBE: NOT DETECTED
GLOBULIN UR ELPH-MCNC: 3.4 GM/DL
GLUCOSE SERPL-MCNC: 102 MG/DL (ref 65–99)
GLUCOSE UR STRIP-MCNC: NEGATIVE MG/DL
HCT VFR BLD AUTO: 34 % (ref 34–46.6)
HGB BLD-MCNC: 10.5 G/DL (ref 12–15.9)
HGB UR QL STRIP.AUTO: ABNORMAL
HOLD SPECIMEN: NORMAL
HOLD SPECIMEN: NORMAL
HYALINE CASTS UR QL AUTO: ABNORMAL /LPF
IMM GRANULOCYTES # BLD AUTO: 0.01 10*3/MM3 (ref 0–0.05)
IMM GRANULOCYTES NFR BLD AUTO: 0.2 % (ref 0–0.5)
KETONES UR QL STRIP: NEGATIVE
LEUKOCYTE ESTERASE UR QL STRIP.AUTO: ABNORMAL
LYMPHOCYTES # BLD AUTO: 2.68 10*3/MM3 (ref 0.7–3.1)
LYMPHOCYTES NFR BLD AUTO: 57 % (ref 19.6–45.3)
MCH RBC QN AUTO: 22 PG (ref 26.6–33)
MCHC RBC AUTO-ENTMCNC: 30.9 G/DL (ref 31.5–35.7)
MCV RBC AUTO: 71.3 FL (ref 79–97)
METHADONE UR QL SCN: NEGATIVE
MONOCYTES # BLD AUTO: 0.42 10*3/MM3 (ref 0.1–0.9)
MONOCYTES NFR BLD AUTO: 8.9 % (ref 5–12)
NEUTROPHILS NFR BLD AUTO: 1.5 10*3/MM3 (ref 1.7–7)
NEUTROPHILS NFR BLD AUTO: 31.9 % (ref 42.7–76)
NITRITE UR QL STRIP: NEGATIVE
NRBC BLD AUTO-RTO: 0 /100 WBC (ref 0–0.2)
OPIATES UR QL: NEGATIVE
OXYCODONE UR QL SCN: NEGATIVE
PCP UR QL SCN: NEGATIVE
PH UR STRIP.AUTO: 7 [PH] (ref 5–9)
PLATELET # BLD AUTO: 237 10*3/MM3 (ref 140–450)
PMV BLD AUTO: 10.7 FL (ref 6–12)
POTASSIUM SERPL-SCNC: 3.3 MMOL/L (ref 3.5–5.2)
PROPOXYPH UR QL: NEGATIVE
PROT SERPL-MCNC: 7.7 G/DL (ref 6–8.5)
PROT UR QL STRIP: ABNORMAL
RBC # BLD AUTO: 4.77 10*6/MM3 (ref 3.77–5.28)
RBC # UR STRIP: ABNORMAL /HPF
REF LAB TEST METHOD: ABNORMAL
SALICYLATES SERPL-MCNC: <0.3 MG/DL
SARS-COV-2 RNA RESP QL NAA+PROBE: DETECTED
SODIUM SERPL-SCNC: 137 MMOL/L (ref 136–145)
SP GR UR STRIP: 1.01 (ref 1–1.03)
SQUAMOUS #/AREA URNS HPF: ABNORMAL /HPF
TRICYCLICS UR QL SCN: NEGATIVE
UROBILINOGEN UR QL STRIP: ABNORMAL
WBC # UR STRIP: ABNORMAL /HPF
WBC NRBC COR # BLD: 4.7 10*3/MM3 (ref 3.4–10.8)
WHOLE BLOOD HOLD COAG: NORMAL
WHOLE BLOOD HOLD SPECIMEN: NORMAL

## 2023-09-16 PROCEDURE — 87636 SARSCOV2 & INF A&B AMP PRB: CPT | Performed by: STUDENT IN AN ORGANIZED HEALTH CARE EDUCATION/TRAINING PROGRAM

## 2023-09-16 PROCEDURE — 80307 DRUG TEST PRSMV CHEM ANLYZR: CPT | Performed by: STUDENT IN AN ORGANIZED HEALTH CARE EDUCATION/TRAINING PROGRAM

## 2023-09-16 PROCEDURE — 80143 DRUG ASSAY ACETAMINOPHEN: CPT | Performed by: STUDENT IN AN ORGANIZED HEALTH CARE EDUCATION/TRAINING PROGRAM

## 2023-09-16 PROCEDURE — 82077 ASSAY SPEC XCP UR&BREATH IA: CPT | Performed by: STUDENT IN AN ORGANIZED HEALTH CARE EDUCATION/TRAINING PROGRAM

## 2023-09-16 PROCEDURE — 80053 COMPREHEN METABOLIC PANEL: CPT | Performed by: STUDENT IN AN ORGANIZED HEALTH CARE EDUCATION/TRAINING PROGRAM

## 2023-09-16 PROCEDURE — 80179 DRUG ASSAY SALICYLATE: CPT | Performed by: STUDENT IN AN ORGANIZED HEALTH CARE EDUCATION/TRAINING PROGRAM

## 2023-09-16 PROCEDURE — 99285 EMERGENCY DEPT VISIT HI MDM: CPT

## 2023-09-16 PROCEDURE — 85025 COMPLETE CBC W/AUTO DIFF WBC: CPT | Performed by: STUDENT IN AN ORGANIZED HEALTH CARE EDUCATION/TRAINING PROGRAM

## 2023-09-16 PROCEDURE — 81001 URINALYSIS AUTO W/SCOPE: CPT | Performed by: STUDENT IN AN ORGANIZED HEALTH CARE EDUCATION/TRAINING PROGRAM

## 2023-09-17 PROBLEM — U07.1 LAB TEST POSITIVE FOR DETECTION OF COVID-19 VIRUS: Status: ACTIVE | Noted: 2023-09-17

## 2023-09-17 PROBLEM — F33.2 SEVERE EPISODE OF RECURRENT MAJOR DEPRESSIVE DISORDER: Status: ACTIVE | Noted: 2023-09-17

## 2023-09-17 PROBLEM — R45.89 SUICIDAL BEHAVIOR: Status: ACTIVE | Noted: 2023-09-17

## 2023-09-17 PROBLEM — F20.9 SCHIZOPHRENIA: Status: ACTIVE | Noted: 2023-09-17

## 2023-09-17 LAB
D-LACTATE SERPL-SCNC: 1 MMOL/L (ref 0.5–2)
PROCALCITONIN SERPL-MCNC: 0.06 NG/ML (ref 0–0.25)
QT INTERVAL: 378 MS
QT INTERVAL: 394 MS
QTC INTERVAL: 427 MS
QTC INTERVAL: 434 MS

## 2023-09-17 PROCEDURE — 84145 PROCALCITONIN (PCT): CPT | Performed by: INTERNAL MEDICINE

## 2023-09-17 PROCEDURE — 87040 BLOOD CULTURE FOR BACTERIA: CPT | Performed by: INTERNAL MEDICINE

## 2023-09-17 PROCEDURE — G0378 HOSPITAL OBSERVATION PER HR: HCPCS

## 2023-09-17 PROCEDURE — 93010 ELECTROCARDIOGRAM REPORT: CPT | Performed by: INTERNAL MEDICINE

## 2023-09-17 PROCEDURE — 93005 ELECTROCARDIOGRAM TRACING: CPT | Performed by: INTERNAL MEDICINE

## 2023-09-17 PROCEDURE — 83605 ASSAY OF LACTIC ACID: CPT | Performed by: INTERNAL MEDICINE

## 2023-09-17 PROCEDURE — 99245 OFF/OP CONSLTJ NEW/EST HI 55: CPT

## 2023-09-17 RX ORDER — BISACODYL 5 MG/1
5 TABLET, DELAYED RELEASE ORAL DAILY PRN
Status: DISCONTINUED | OUTPATIENT
Start: 2023-09-17 | End: 2023-09-21 | Stop reason: HOSPADM

## 2023-09-17 RX ORDER — ESCITALOPRAM OXALATE 5 MG/1
5 TABLET ORAL DAILY
Status: DISCONTINUED | OUTPATIENT
Start: 2023-09-17 | End: 2023-09-19

## 2023-09-17 RX ORDER — MORPHINE SULFATE 2 MG/ML
2 INJECTION, SOLUTION INTRAMUSCULAR; INTRAVENOUS EVERY 4 HOURS PRN
Status: DISCONTINUED | OUTPATIENT
Start: 2023-09-17 | End: 2023-09-21 | Stop reason: HOSPADM

## 2023-09-17 RX ORDER — ACETAMINOPHEN 325 MG/1
650 TABLET ORAL EVERY 4 HOURS PRN
Status: DISCONTINUED | OUTPATIENT
Start: 2023-09-17 | End: 2023-09-21 | Stop reason: HOSPADM

## 2023-09-17 RX ORDER — ENOXAPARIN SODIUM 100 MG/ML
40 INJECTION SUBCUTANEOUS DAILY
Status: DISCONTINUED | OUTPATIENT
Start: 2023-09-17 | End: 2023-09-21 | Stop reason: HOSPADM

## 2023-09-17 RX ORDER — ACETAMINOPHEN 650 MG/1
650 SUPPOSITORY RECTAL EVERY 4 HOURS PRN
Status: DISCONTINUED | OUTPATIENT
Start: 2023-09-17 | End: 2023-09-21 | Stop reason: HOSPADM

## 2023-09-17 RX ORDER — SODIUM CHLORIDE 9 MG/ML
40 INJECTION, SOLUTION INTRAVENOUS AS NEEDED
Status: DISCONTINUED | OUTPATIENT
Start: 2023-09-17 | End: 2023-09-21 | Stop reason: HOSPADM

## 2023-09-17 RX ORDER — SODIUM CHLORIDE 0.9 % (FLUSH) 0.9 %
10 SYRINGE (ML) INJECTION EVERY 12 HOURS SCHEDULED
Status: DISCONTINUED | OUTPATIENT
Start: 2023-09-17 | End: 2023-09-21 | Stop reason: HOSPADM

## 2023-09-17 RX ORDER — ACETAMINOPHEN 160 MG/5ML
650 SOLUTION ORAL EVERY 4 HOURS PRN
Status: DISCONTINUED | OUTPATIENT
Start: 2023-09-17 | End: 2023-09-21 | Stop reason: HOSPADM

## 2023-09-17 RX ORDER — AMOXICILLIN 250 MG
2 CAPSULE ORAL 2 TIMES DAILY
Status: DISCONTINUED | OUTPATIENT
Start: 2023-09-17 | End: 2023-09-21 | Stop reason: HOSPADM

## 2023-09-17 RX ORDER — CHOLECALCIFEROL (VITAMIN D3) 125 MCG
5 CAPSULE ORAL NIGHTLY PRN
Status: DISCONTINUED | OUTPATIENT
Start: 2023-09-17 | End: 2023-09-21 | Stop reason: HOSPADM

## 2023-09-17 RX ORDER — ESCITALOPRAM OXALATE 10 MG/1
10 TABLET ORAL DAILY
Status: DISCONTINUED | OUTPATIENT
Start: 2023-09-18 | End: 2023-09-21 | Stop reason: HOSPADM

## 2023-09-17 RX ORDER — NITROGLYCERIN 0.4 MG/1
0.4 TABLET SUBLINGUAL
Status: DISCONTINUED | OUTPATIENT
Start: 2023-09-17 | End: 2023-09-21 | Stop reason: HOSPADM

## 2023-09-17 RX ORDER — ARIPIPRAZOLE 10 MG/1
10 TABLET ORAL DAILY
Status: DISCONTINUED | OUTPATIENT
Start: 2023-09-17 | End: 2023-09-17

## 2023-09-17 RX ORDER — ARIPIPRAZOLE 15 MG/1
15 TABLET ORAL DAILY
Status: DISCONTINUED | OUTPATIENT
Start: 2023-09-18 | End: 2023-09-19

## 2023-09-17 RX ORDER — LEVOFLOXACIN 750 MG/1
750 TABLET ORAL EVERY 24 HOURS
Status: COMPLETED | OUTPATIENT
Start: 2023-09-17 | End: 2023-09-21

## 2023-09-17 RX ORDER — SODIUM CHLORIDE 0.9 % (FLUSH) 0.9 %
10 SYRINGE (ML) INJECTION AS NEEDED
Status: DISCONTINUED | OUTPATIENT
Start: 2023-09-17 | End: 2023-09-21 | Stop reason: HOSPADM

## 2023-09-17 RX ORDER — NALOXONE HCL 0.4 MG/ML
0.4 VIAL (ML) INJECTION
Status: DISCONTINUED | OUTPATIENT
Start: 2023-09-17 | End: 2023-09-21 | Stop reason: HOSPADM

## 2023-09-17 RX ORDER — POLYETHYLENE GLYCOL 3350 17 G/17G
17 POWDER, FOR SOLUTION ORAL DAILY PRN
Status: DISCONTINUED | OUTPATIENT
Start: 2023-09-17 | End: 2023-09-21 | Stop reason: HOSPADM

## 2023-09-17 RX ORDER — ONDANSETRON 2 MG/ML
4 INJECTION INTRAMUSCULAR; INTRAVENOUS EVERY 6 HOURS PRN
Status: DISCONTINUED | OUTPATIENT
Start: 2023-09-17 | End: 2023-09-21 | Stop reason: HOSPADM

## 2023-09-17 RX ORDER — BISACODYL 10 MG
10 SUPPOSITORY, RECTAL RECTAL DAILY PRN
Status: DISCONTINUED | OUTPATIENT
Start: 2023-09-17 | End: 2023-09-21 | Stop reason: HOSPADM

## 2023-09-17 RX ORDER — LEVOFLOXACIN 5 MG/ML
750 INJECTION, SOLUTION INTRAVENOUS EVERY 24 HOURS
Status: DISCONTINUED | OUTPATIENT
Start: 2023-09-17 | End: 2023-09-17

## 2023-09-17 RX ORDER — ALBUTEROL SULFATE 2.5 MG/3ML
2.5 SOLUTION RESPIRATORY (INHALATION) EVERY 4 HOURS PRN
Status: DISCONTINUED | OUTPATIENT
Start: 2023-09-17 | End: 2023-09-21 | Stop reason: HOSPADM

## 2023-09-17 RX ADMIN — DOCUSATE SODIUM 50 MG AND SENNOSIDES 8.6 MG 2 TABLET: 8.6; 5 TABLET, FILM COATED ORAL at 20:37

## 2023-09-17 RX ADMIN — ESCITALOPRAM 5 MG: 5 TABLET, FILM COATED ORAL at 20:36

## 2023-09-17 RX ADMIN — ARIPIPRAZOLE 10 MG: 10 TABLET ORAL at 09:46

## 2023-09-17 RX ADMIN — Medication 5 MG: at 20:36

## 2023-09-17 RX ADMIN — LEVOFLOXACIN 750 MG: 750 TABLET, FILM COATED ORAL at 11:56

## 2023-09-17 NOTE — PLAN OF CARE
Goal Outcome Evaluation:            Vss, no distress, awaiting psych to see,1:1 continued

## 2023-09-17 NOTE — ED PROVIDER NOTES
Subjective   History of Present Illness  28-year-old homeless individual with a history of schizophrenia not on medication comes to the ER chief complaint of hearing voices and having thoughts 1 herself.  She does not feel safe.    History provided by:  Patient  History limited by:  Psychiatric disorder   used: No      Review of Systems   Unable to perform ROS: Psychiatric disorder     Past Medical History:   Diagnosis Date    Lupus     Vaginal delivery        Allergies   Allergen Reactions    Amoxicillin     Biaxin [Clarithromycin]     Erythromycin     Penicillins        Past Surgical History:   Procedure Laterality Date    DILATATION AND CURETTAGE      INNER EAR SURGERY      MOUTH SURGERY         No family history on file.    Social History     Socioeconomic History    Marital status: Single   Tobacco Use    Smoking status: Never   Substance and Sexual Activity    Alcohol use: No    Drug use: Yes     Types: Marijuana           Objective   Vitals:    09/16/23 2239 09/16/23 2244 09/16/23 2348 09/17/23 0149   BP:  121/60 115/63 107/59   BP Location:  Right arm Left arm Left arm   Patient Position:  Lying Lying Lying   Pulse: 117 117 104 107   Resp: 18 18 18 18   Temp:       TempSrc:       SpO2:  99% 98% 98%   Weight:       Height:           Physical Exam  Vitals and nursing note reviewed.   Constitutional:       General: She is not in acute distress.     Appearance: She is well-developed. She is not diaphoretic.   HENT:      Head: Normocephalic.   Eyes:      Conjunctiva/sclera: Conjunctivae normal.   Pulmonary:      Effort: Pulmonary effort is normal. No accessory muscle usage or respiratory distress.   Skin:     General: Skin is warm and dry.   Psychiatric:         Attention and Perception: She perceives auditory hallucinations. She does not perceive visual hallucinations.         Thought Content: Thought content includes suicidal ideation. Thought content does not include homicidal ideation.  Thought content does not include homicidal or suicidal plan.       Procedures           ED Course      Results for orders placed or performed during the hospital encounter of 09/16/23   COVID-19 and FLU A/B PCR - Swab, Nasopharynx    Specimen: Nasopharynx; Swab   Result Value Ref Range    COVID19 Detected (C) Not Detected - Ref. Range    Influenza A PCR Not Detected Not Detected    Influenza B PCR Not Detected Not Detected   Comprehensive Metabolic Panel    Specimen: Blood   Result Value Ref Range    Glucose 102 (H) 65 - 99 mg/dL    BUN 6 6 - 20 mg/dL    Creatinine 0.94 0.57 - 1.00 mg/dL    Sodium 137 136 - 145 mmol/L    Potassium 3.3 (L) 3.5 - 5.2 mmol/L    Chloride 101 98 - 107 mmol/L    CO2 21.0 (L) 22.0 - 29.0 mmol/L    Calcium 8.7 8.6 - 10.5 mg/dL    Total Protein 7.7 6.0 - 8.5 g/dL    Albumin 4.3 3.5 - 5.2 g/dL    ALT (SGPT) 25 1 - 33 U/L    AST (SGOT) 48 (H) 1 - 32 U/L    Alkaline Phosphatase 70 39 - 117 U/L    Total Bilirubin 0.3 0.0 - 1.2 mg/dL    Globulin 3.4 gm/dL    A/G Ratio 1.3 g/dL    BUN/Creatinine Ratio 6.4 (L) 7.0 - 25.0    Anion Gap 15.0 5.0 - 15.0 mmol/L    eGFR 85.5 >60.0 mL/min/1.73   Acetaminophen Level    Specimen: Blood   Result Value Ref Range    Acetaminophen <5.0 0.0 - 30.0 mcg/mL   Ethanol    Specimen: Blood   Result Value Ref Range    Ethanol <10 0 - 10 mg/dL    Ethanol % <0.010 %   Salicylate Level    Specimen: Blood   Result Value Ref Range    Salicylate <0.3 <=30.0 mg/dL   Urine Drug Screen - Urine, Clean Catch    Specimen: Urine, Clean Catch   Result Value Ref Range    THC, Screen, Urine Negative Negative    Phencyclidine (PCP), Urine Negative Negative    Cocaine Screen, Urine Negative Negative    Methamphetamine, Ur Negative Negative    Opiate Screen Negative Negative    Amphetamine Screen, Urine Negative Negative    Benzodiazepine Screen, Urine Negative Negative    Tricyclic Antidepressants Screen Negative Negative    Methadone Screen, Urine Negative Negative    Barbiturates  Screen, Urine Negative Negative    Oxycodone Screen, Urine Negative Negative    Propoxyphene Screen Negative Negative    Buprenorphine, Screen, Urine Negative Negative   Urinalysis With Microscopic If Indicated (No Culture) - Urine, Clean Catch    Specimen: Urine, Clean Catch   Result Value Ref Range    Color, UA Yellow Yellow, Straw, Dark Yellow, Chika    Appearance, UA Cloudy (A) Clear    pH, UA 7.0 5.0 - 9.0    Specific Gravity, UA 1.013 1.003 - 1.030    Glucose, UA Negative Negative    Ketones, UA Negative Negative    Bilirubin, UA Negative Negative    Blood, UA Trace (A) Negative    Protein,  mg/dL (2+) (A) Negative    Leuk Esterase, UA Large (3+) (A) Negative    Nitrite, UA Negative Negative    Urobilinogen, UA 1.0 E.U./dL 0.2 - 1.0 E.U./dL   CBC Auto Differential    Specimen: Blood   Result Value Ref Range    WBC 4.70 3.40 - 10.80 10*3/mm3    RBC 4.77 3.77 - 5.28 10*6/mm3    Hemoglobin 10.5 (L) 12.0 - 15.9 g/dL    Hematocrit 34.0 34.0 - 46.6 %    MCV 71.3 (L) 79.0 - 97.0 fL    MCH 22.0 (L) 26.6 - 33.0 pg    MCHC 30.9 (L) 31.5 - 35.7 g/dL    RDW 16.6 (H) 12.3 - 15.4 %    RDW-SD 42.5 37.0 - 54.0 fl    MPV 10.7 6.0 - 12.0 fL    Platelets 237 140 - 450 10*3/mm3    Neutrophil % 31.9 (L) 42.7 - 76.0 %    Lymphocyte % 57.0 (H) 19.6 - 45.3 %    Monocyte % 8.9 5.0 - 12.0 %    Eosinophil % 1.1 0.3 - 6.2 %    Basophil % 0.9 0.0 - 1.5 %    Immature Grans % 0.2 0.0 - 0.5 %    Neutrophils, Absolute 1.50 (L) 1.70 - 7.00 10*3/mm3    Lymphocytes, Absolute 2.68 0.70 - 3.10 10*3/mm3    Monocytes, Absolute 0.42 0.10 - 0.90 10*3/mm3    Eosinophils, Absolute 0.05 0.00 - 0.40 10*3/mm3    Basophils, Absolute 0.04 0.00 - 0.20 10*3/mm3    Immature Grans, Absolute 0.01 0.00 - 0.05 10*3/mm3    nRBC 0.0 0.0 - 0.2 /100 WBC   Fentanyl, Urine - Urine, Clean Catch    Specimen: Urine, Clean Catch   Result Value Ref Range    Fentanyl, Urine Negative Negative   Urinalysis, Microscopic Only - Urine, Clean Catch    Specimen: Urine, Clean  Catch   Result Value Ref Range    RBC, UA 0-2 (A) None Seen /HPF    WBC, UA 13-20 (A) None Seen, 0-2, 3-5 /HPF    Bacteria, UA 2+ (A) None Seen /HPF    Squamous Epithelial Cells, UA 13-20 (A) None Seen, 0-2 /HPF    Hyaline Casts, UA Unable to determine due to loaded field None Seen /LPF    Fine Granular Casts, UA 0-2 None Seen /LPF    Methodology Manual Light Microscopy    Green Top (Gel)   Result Value Ref Range    Extra Tube Hold for add-ons.    Lavender Top   Result Value Ref Range    Extra Tube hold for add-on    Gold Top - SST   Result Value Ref Range    Extra Tube Hold for add-ons.    Light Blue Top   Result Value Ref Range    Extra Tube Hold for add-ons.                 Medical Decision Making  Vital signs are stable, afebrile.  Patient medically cleared in the ER.  COVID-positive, UA is contaminated.  Spoke with psychiatry on-call recommended having Amber Carlos evaluate the patient due to the patient having COVID.  Amber Carlos recommended a safety plan and discharged home.  Our on-call psychiatrist disagrees with that plan and does not believe the patient is a safe discharge.  He request that the patient be admitted medically and he be consulted tomorrow to see the patient on the floor.  Discussed the case with the on-call hospitalist who agrees to admit.    Problems Addressed:  Auditory hallucination: complicated acute illness or injury  COVID: complicated acute illness or injury  Suicidal behavior without attempted self-injury: complicated acute illness or injury    Amount and/or Complexity of Data Reviewed  Labs: ordered.    Risk  Decision regarding hospitalization.        Final diagnoses:   Suicidal behavior without attempted self-injury   Auditory hallucination   COVID       ED Disposition  ED Disposition       ED Disposition   Decision to Admit    Condition   --    Comment   Level of Care: Med/Surg [1]   Diagnosis: Suicidal behavior [575923]   Admitting Physician: WOODY WATT [[id number]]   Attending  Physician: WOODY WATT [[id number]]                 No follow-up provider specified.       Medication List      No changes were made to your prescriptions during this visit.            Isma Dash MD  09/17/23 0207
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
## 2023-09-17 NOTE — H&P
[facility] Medicine  HISTORY AND PHYSICAL      Date of Admission: 9/16/2023  Primary Care Physician: Provider, No Known    Subjective     Chief Complaint: Suicidal ideation, COVID-positive    History of Present Illness  Really nice patient with past medical history of schizophrenia, lupus, asthma presents with suicidal ideation.  Patient extremely nice and personable.  Patient states that she was in the park, but thought a car was chasing her and she started to run.  Admits to suicidal ideation, and states that voices tell her to run, and that no one will ever love her or like her.  Patient currently homeless, and lost custody of her child.  Patient presented to emergency room for suicidal ideation assistance, but cannot be admitted to psychiatric floor due to COVID-positive status.  On room air without signs of respiratory distress.  Denies fevers, chills, sick contacts, recent travel, productive cough, headaches.        Review of Systems   Otherwise complete ROS reviewed and negative except as mentioned in the HPI.    Past Medical History:   Past Medical History:   Diagnosis Date    Lupus     Vaginal delivery      Past Surgical History:  Past Surgical History:   Procedure Laterality Date    DILATATION AND CURETTAGE      INNER EAR SURGERY      MOUTH SURGERY       Social History:  reports that she has never smoked. She has never used smokeless tobacco. She reports current drug use. Drug: Marijuana. She reports that she does not drink alcohol.  Currently homeless    Family History: family history is not on file.  States mother suffers from fibromyalgia and lupus.  States father has heart problems       Allergies:  Allergies   Allergen Reactions    Amoxicillin     Biaxin [Clarithromycin]     Erythromycin     Penicillins        Medications:  Prior to Admission medications    Medication Sig Start Date End Date Taking? Authorizing Provider   nitrofurantoin,  "macrocrystal-monohydrate, (MACROBID) 100 MG capsule Take 1 capsule by mouth 2 (Two) Times a Day for 7 days. 9/14/23 9/21/23 Yes Suzy Fuentes APRN   polyethylene glycol (MIRALAX) 17 g packet Take 17 g by mouth Daily for 5 days. 9/14/23 9/19/23 Yes Suzy Fuentes APRN     I have utilized all available immediate resources to obtain, update, and review the patient's current medications.    Objective     Vital Signs: /73   Pulse 90   Temp 97 °F (36.1 °C)   Resp 18   Ht 160 cm (63\")   Wt 93.9 kg (207 lb)   LMP 08/27/2023 (Exact Date)   SpO2 98%   BMI 36.67 kg/m²   Physical Exam  Constitutional:       Appearance: Normal appearance. She is normal weight.   HENT:      Head: Normocephalic and atraumatic.      Right Ear: Ear canal normal.      Left Ear: Ear canal normal.      Nose: Nose normal.      Mouth/Throat:      Mouth: Mucous membranes are moist.      Pharynx: Oropharynx is clear.   Eyes:      Conjunctiva/sclera: Conjunctivae normal.      Pupils: Pupils are equal, round, and reactive to light.   Cardiovascular:      Rate and Rhythm: Normal rate and regular rhythm.      Pulses: Normal pulses.      Heart sounds: Normal heart sounds.   Pulmonary:      Effort: Pulmonary effort is normal.      Breath sounds: Normal breath sounds.   Abdominal:      General: Abdomen is flat. Bowel sounds are normal.      Palpations: Abdomen is soft.   Musculoskeletal:         General: Normal range of motion.   Skin:     General: Skin is warm.      Capillary Refill: Capillary refill takes less than 2 seconds.   Neurological:      General: No focal deficit present.      Mental Status: She is alert and oriented to person, place, and time.   Psychiatric:         Mood and Affect: Mood normal.         Behavior: Behavior normal.          Results Reviewed:  Lab Results (last 24 hours)       Procedure Component Value Units Date/Time    [place] Draw [[id number]] Collected: 09/16/23 2038    Specimen: Blood Updated: 09/16/23 2146 " "   Narrative:      The following orders were created for panel order [place] Draw.  Procedure                               Abnormality         Status                     ---------                               -----------         ------                     Green Top (Gel)[[id number]]                                  Final result               Lavender Top[[id number]]                                     Final result               Gold Top - SST[[id number]]                                   Final result               Light Blue Top[[id number]]                                   Final result                 Please view results for these tests on the individual orders.    Gold Top - SST [[id number]] Collected: 09/16/23 2038    Specimen: Blood Updated: 09/16/23 2146     Extra Tube Hold for add-ons.     Comment: Auto resulted.       Green Top (Gel) [[id number]] Collected: 09/16/23 2038    Specimen: Blood Updated: 09/16/23 2146     Extra Tube Hold for add-ons.     Comment: Auto resulted.       Lavender Top [[id number]] Collected: 09/16/23 2038    Specimen: Blood Updated: 09/16/23 2146     Extra Tube hold for add-on     Comment: Auto resulted       Light Blue Top [[id number]] Collected: 09/16/23 2038    Specimen: Blood Updated: 09/16/23 2146     Extra Tube Hold for add-ons.     Comment: Auto resulted       Urinalysis, Microscopic Only - Urine, Clean Catch [[id number]]  (Abnormal) Collected: 09/16/23 2044    Specimen: Urine, Clean Catch Updated: 09/16/23 2140     RBC, UA 0-2 /HPF      WBC, UA 13-20 /HPF      Bacteria, UA 2+ /HPF      Squamous Epithelial Cells, UA 13-20 /HPF      Comment: \"CLUE\" CELLS        Hyaline Casts, UA       Unable to determine due to loaded field     /LPF     Fine Granular Casts, UA 0-2 /LPF      Methodology Manual Light Microscopy    COVID-19 and FLU A/B PCR - Swab, Nasopharynx [[id number]]  (Abnormal) Collected: 09/16/23 2041    Specimen: Swab from Nasopharynx Updated: 09/16/23 2124     COVID19 Detected     " Influenza A PCR Not Detected     Influenza B PCR Not Detected    Narrative:      Fact sheet for providers: https://www.fda.gov/media/885242/download    Fact sheet for patients: https://www.fda.gov/media/399508/download    Test performed by PCR.  Influenza A and Influenza B negative results should be considered presumptive in samples that have a positive SARS-CoV-2 result.    Competitive inhibition studies showed that SARS-CoV-2 virus, when present at concentrations above 3.6E+04 copies/mL, can inhibit the detection and amplification of influenza A and influenza B virus RNA if present at or below 1.8E+02 copies/mL or 4.9E+02 copies/mL, respectively, and may lead to false negative influenza virus results. If co-infection with influenza A or influenza B virus is suspected in samples with a positive SARS-CoV-2 result, the sample should be re-tested with another FDA cleared, approved, or authorized influenza test, if influenza virus detection would change clinical management.    Fentanyl, Urine - Urine, Clean Catch [[id number]]  (Normal) Collected: 09/16/23 2044    Specimen: Urine, Clean Catch Updated: 09/16/23 2121     Fentanyl, Urine Negative    Narrative:      Negative Threshold:      Fentanyl 5 ng/mL     The normal value for the drug tested is negative. This report includes final unconfirmed screening results to be used for medical treatment purposes only. Unconfirmed results must not be used for non-medical purposes such as employment or legal testing. Clinical consideration should be applied to any drug of abuse test, particularly when unconfirmed results are used.           Comprehensive Metabolic Panel [[id number]]  (Abnormal) Collected: 09/16/23 2038    Specimen: Blood Updated: 09/16/23 2118     Glucose 102 mg/dL      BUN 6 mg/dL      Creatinine 0.94 mg/dL      Sodium 137 mmol/L      Potassium 3.3 mmol/L      Chloride 101 mmol/L      CO2 21.0 mmol/L      Calcium 8.7 mg/dL      Total Protein 7.7 g/dL      Albumin  4.3 g/dL      ALT (SGPT) 25 U/L      AST (SGOT) 48 U/L      Alkaline Phosphatase 70 U/L      Total Bilirubin 0.3 mg/dL      Globulin 3.4 gm/dL      A/G Ratio 1.3 g/dL      BUN/Creatinine Ratio 6.4     Anion Gap 15.0 mmol/L      eGFR 85.5 mL/min/1.73     Narrative:      GFR Normal >60  Chronic Kidney Disease <60  Kidney Failure <15      Acetaminophen Level [[id number]]  (Normal) Collected: 09/16/23 2038    Specimen: Blood Updated: 09/16/23 2118     Acetaminophen <5.0 mcg/mL     Ethanol [[id number]] Collected: 09/16/23 2038    Specimen: Blood Updated: 09/16/23 2118     Ethanol <10 mg/dL      Ethanol % <0.010 %     Salicylate Level [[id number]]  (Normal) Collected: 09/16/23 2038    Specimen: Blood Updated: 09/16/23 2118     Salicylate <0.3 mg/dL     Urine Drug Screen - Urine, Clean Catch [[id number]]  (Normal) Collected: 09/16/23 2044    Specimen: Urine, Clean Catch Updated: 09/16/23 2109     THC, Screen, Urine Negative     Phencyclidine (PCP), Urine Negative     Cocaine Screen, Urine Negative     Methamphetamine, Ur Negative     Opiate Screen Negative     Amphetamine Screen, Urine Negative     Benzodiazepine Screen, Urine Negative     Tricyclic Antidepressants Screen Negative     Methadone Screen, Urine Negative     Barbiturates Screen, Urine Negative     Oxycodone Screen, Urine Negative     Propoxyphene Screen Negative     Buprenorphine, Screen, Urine Negative    Narrative:      Cutoff For Drugs Screened:    Amphetamines               500 ng/ml  Barbiturates               200 ng/ml  Benzodiazepines            150 ng/ml  Cocaine                    150 ng/ml  Methadone                  200 ng/ml  Opiates                    100 ng/ml  Phencyclidine               25 ng/ml  THC                            50 ng/ml  Methamphetamine            500 ng/ml  Tricyclic Antidepressants  300 ng/ml  Oxycodone                  100 ng/ml  Propoxyphene               300 ng/ml  Buprenorphine               10 ng/ml    The normal value for  all drugs tested is negative. This report includes unconfirmed screening results, with the cutoff values listed, to be used for medical treatment purposes only.  Unconfirmed results must not be used for non-medical purposes such as employment or legal testing.  Clinical consideration should be applied to any drug of abuse test, particularly when unconfirmed results are used.      Urinalysis With Microscopic If Indicated (No Culture) - Urine, Clean Catch [[id number]]  (Abnormal) Collected: 09/16/23 2044    Specimen: Urine, Clean Catch Updated: 09/16/23 2101     Color, UA Yellow     Appearance, UA Cloudy     pH, UA 7.0     Specific Gravity, UA 1.013     Glucose, UA Negative     Ketones, UA Negative     Bilirubin, UA Negative     Blood, UA Trace     Protein,  mg/dL (2+)     Leuk Esterase, UA Large (3+)     Nitrite, UA Negative     Urobilinogen, UA 1.0 E.U./dL    CBC & Differential [[id number]]  (Abnormal) Collected: 09/16/23 2038    Specimen: Blood Updated: 09/16/23 2100    Narrative:      The following orders were created for panel order CBC & Differential.  Procedure                               Abnormality         Status                     ---------                               -----------         ------                     CBC Auto Differential[[id number]]        Abnormal            Final result                 Please view results for these tests on the individual orders.    CBC Auto Differential [[id number]]  (Abnormal) Collected: 09/16/23 2038    Specimen: Blood Updated: 09/16/23 2100     WBC 4.70 10*3/mm3      RBC 4.77 10*6/mm3      Hemoglobin 10.5 g/dL      Hematocrit 34.0 %      MCV 71.3 fL      MCH 22.0 pg      MCHC 30.9 g/dL      RDW 16.6 %      RDW-SD 42.5 fl      MPV 10.7 fL      Platelets 237 10*3/mm3      Neutrophil % 31.9 %      Lymphocyte % 57.0 %      Monocyte % 8.9 %      Eosinophil % 1.1 %      Basophil % 0.9 %      Immature Grans % 0.2 %      Neutrophils, Absolute 1.50 10*3/mm3       Lymphocytes, Absolute 2.68 10*3/mm3      Monocytes, Absolute 0.42 10*3/mm3      Eosinophils, Absolute 0.05 10*3/mm3      Basophils, Absolute 0.04 10*3/mm3      Immature Grans, Absolute 0.01 10*3/mm3      nRBC 0.0 /100 WBC           Imaging Results (Last 24 Hours)       ** No results found for the last 24 hours. **          I have personally reviewed and interpreted the radiology studies and ECG obtained at time of admission.     Assessment / Plan     Assessment:   Active Hospital Problems    Diagnosis     **Suicidal behavior      Plan:   Suicidal ideation and schizophrenia patient:  - Restart patient on Abilify, consult psychiatry, suicidal precautions, sitter at all times.    Homelessness: Consult case management for disposition arrangements    Asthma: As needed inhalers  Lupus: Continue home management  PPx: Lovenox subcutaneous      Medical Decision Making  Number and Complexity of problems: See above  Differential Diagnosis: See above    Conditions and Status:        Condition is improving.    I have utilized all available immediate resources to obtain, update, or review the patient's current medications (including all prescriptions, over-the-counter products, herbals, cannabis/cannabidiol products, and vitamin/mineral/dietary (nutritional) supplements).      [facility] Data  External documents reviewed: Up-to-date, care everywhere  My EKG interpretation: Ordered at time of admission  My plain film interpretation: Ordered at time of admission by Dr. Pineda with results pending  Tests considered but not ordered:       Decision rules/scores evaluated (example [id number], [place], etc):       Discussed with: Patient     Treatment Plan  See above    Care Planning  Shared decision making: Patient, Dr. Pineda  Code status and discussions: Full    Disposition  Social Determinants of Health that impact treatment or disposition: Currently homeless  I expect the patient to be discharged to homeless shelter in 2-5 days.     The  patient was seen and examined by me on 9/17/2023 at 610.    Electronically signed by Freddy Pineda MD, 09/17/23, 05:54 CDT.

## 2023-09-17 NOTE — PLAN OF CARE
Goal Outcome Evaluation:      New patient from ER, 1:1. Pt denies any homicidal or suicidal thoughts

## 2023-09-17 NOTE — PROGRESS NOTES
[facility] Medicine   INPATIENT PROGRESS NOTE      Patient Name: Bailee Lora  Date of Admission: 9/16/2023  Today's Date: 09/17/23  Length of Stay: 0  Primary Care Physician: Provider, No Known    Subjective   Chief Complaint and HPI:  Patient with past medical history of schizophrenia, lupus, asthma presents with suicidal ideation. Patient extremely nice and personable. Patient states that she was in the park, but thought a car was chasing her and she started to run. Admits to suicidal ideation, and states that voices tell her to run, and that no one will ever love her or like her. Patient currently homeless, and lost custody of her child. Patient presented to emergency room for suicidal ideation assistance, but cannot be admitted to psychiatric floor due to COVID-positive status. On room air without signs of respiratory distress. Denies fevers, chills, sick contacts, recent travel, productive cough, headaches.     9/17/2023: Resting comfortably.  No respiratory distress.  No new complaints.      Review of Systems   Review of Systems as of 09/17/23   All pertinent negatives and positives are as above. All other systems have been reviewed and are negative unless otherwise stated.     Objective    Temp:  [97 °F (36.1 °C)-98.4 °F (36.9 °C)] 97 °F (36.1 °C)  Heart Rate:  [] 90  Resp:  [18-19] 18  BP: (107-146)/(59-76) 113/73  Physical Exam    Vitals and nursing note reviewed.   Constitutional:       General: No acute distress.     Appearance: Normal appearance.   HENT:      Head: Normocephalic and atraumatic.      Mouth: Mucous membranes are moist.   Eyes:      Conjunctivae/sclerae: Conjunctivae normal.      Pupils: Pupils are equal, round, and reactive to light.   Cardiovascular:      Rate and Rhythm: Normal rate and regular rhythm.   Pulmonary:      Effort: Pulmonary effort is normal.      Breath sounds: Normal breath sounds.   Abdominal:       General: Abdomen is flat.      Palpations: Abdomen is soft.      Tenderness: No abdominal tenderness.   Genitourinary:     General:   normal  Musculoskeletal:         General: No signs of injury. Normal range of motion.   Skin:     General: Skin is warm and dry.   Neurological:      General: No focal deficit present.      Mental Status: Alert and oriented  Psychiatric:         Mood and Affect: Mood normal.         Behavior: Behavior normal.         Results Review:  I have reviewed the labs, radiology results, and diagnostic studies.    Laboratory Data:   Results from last 7 days   Lab Units 09/16/23 2038 09/14/23 2025   WBC 10*3/mm3 4.70 4.59   HEMOGLOBIN g/dL 10.5* 9.7*   HEMATOCRIT % 34.0 32.1*   PLATELETS 10*3/mm3 237 183        Results from last 7 days   Lab Units 09/16/23 2038 09/14/23 2025   SODIUM mmol/L 137 142   POTASSIUM mmol/L 3.3* 4.0   CHLORIDE mmol/L 101 107   CO2 mmol/L 21.0* 27.0   BUN mg/dL 6 7   CREATININE mg/dL 0.94 0.81   CALCIUM mg/dL 8.7 8.5*   BILIRUBIN mg/dL 0.3 0.2   ALK PHOS U/L 70 59   ALT (SGPT) U/L 25 7   AST (SGOT) U/L 48* 16   GLUCOSE mg/dL 102* 89       Culture Data:   No results found for: BLOODCX  No results found for: URINECX  No results found for: RESPCX  No results found for: WOUNDCX  No results found for: STOOLCX  No components found for: BODYFLD    Radiology Data:   Imaging Results (Last 24 Hours)       ** No results found for the last 24 hours. **            I have reviewed the patient's current medications.     Assessment/Plan          Active Hospital Problems    Diagnosis     **Suicidal behavior        Medical Decision Making  Number and Complexity of problems: Multiple, complex    Conditions and Status:        Condition is unchanged.     MDM Data  External documents reviewed: prior hospitalizations, ED notes, consultation notes  My EKG interpretation: None today  My  interpretation: None today  Tests considered but not ordered: None today       Discussed with:  Patient, attending      Treatment Plan  As below    Care Planning  Shared decision making: With patient  Code status and discussions: Full code, CPR    Disposition  Social Determinants of Health that impact treatment or disposition: Homelessness  I expect the patient to be discharged in 1 to 4 days.    I confirmed that the patient's Advance Care Plan is present, code status is documented, or surrogate decision maker is listed in the patient's medical record.   ________________________________      Suicidal ideation and schizophrenia patient:  - Restart patient on Abilify, consult psychiatry, suicidal precautions, sitter at all times.    UTI: Levaquin p.o.     Homelessness: Consult case management for disposition arrangements     Asthma: As needed inhalers  Lupus: Continue home management  PPx: Lovenox subcutaneous      Copied text in this note has been reviewed and is accurate as of 09/17/23       Electronically signed by Ivania Ojeda PA-C, 09/17/23, 09:41 CDT.

## 2023-09-18 ENCOUNTER — APPOINTMENT (OUTPATIENT)
Dept: GENERAL RADIOLOGY | Facility: HOSPITAL | Age: 28
End: 2023-09-18

## 2023-09-18 LAB
ANION GAP SERPL CALCULATED.3IONS-SCNC: 11 MMOL/L (ref 5–15)
BASOPHILS # BLD AUTO: 0.02 10*3/MM3 (ref 0–0.2)
BASOPHILS NFR BLD AUTO: 0.4 % (ref 0–1.5)
BUN SERPL-MCNC: 7 MG/DL (ref 6–20)
BUN/CREAT SERPL: 8.2 (ref 7–25)
CALCIUM SPEC-SCNC: 8.6 MG/DL (ref 8.6–10.5)
CHLORIDE SERPL-SCNC: 104 MMOL/L (ref 98–107)
CO2 SERPL-SCNC: 23 MMOL/L (ref 22–29)
CREAT SERPL-MCNC: 0.85 MG/DL (ref 0.57–1)
DEPRECATED RDW RBC AUTO: 42.2 FL (ref 37–54)
EGFRCR SERPLBLD CKD-EPI 2021: 96.4 ML/MIN/1.73
EOSINOPHIL # BLD AUTO: 0.04 10*3/MM3 (ref 0–0.4)
EOSINOPHIL NFR BLD AUTO: 0.9 % (ref 0.3–6.2)
ERYTHROCYTE [DISTWIDTH] IN BLOOD BY AUTOMATED COUNT: 16.6 % (ref 12.3–15.4)
GLUCOSE SERPL-MCNC: 101 MG/DL (ref 65–99)
HCT VFR BLD AUTO: 34.2 % (ref 34–46.6)
HGB BLD-MCNC: 10.5 G/DL (ref 12–15.9)
IMM GRANULOCYTES # BLD AUTO: 0.01 10*3/MM3 (ref 0–0.05)
IMM GRANULOCYTES NFR BLD AUTO: 0.2 % (ref 0–0.5)
LYMPHOCYTES # BLD AUTO: 1.76 10*3/MM3 (ref 0.7–3.1)
LYMPHOCYTES NFR BLD AUTO: 39.5 % (ref 19.6–45.3)
MAGNESIUM SERPL-MCNC: 1.9 MG/DL (ref 1.6–2.6)
MCH RBC QN AUTO: 21.7 PG (ref 26.6–33)
MCHC RBC AUTO-ENTMCNC: 30.7 G/DL (ref 31.5–35.7)
MCV RBC AUTO: 70.7 FL (ref 79–97)
MONOCYTES # BLD AUTO: 0.3 10*3/MM3 (ref 0.1–0.9)
MONOCYTES NFR BLD AUTO: 6.7 % (ref 5–12)
NEUTROPHILS NFR BLD AUTO: 2.33 10*3/MM3 (ref 1.7–7)
NEUTROPHILS NFR BLD AUTO: 52.3 % (ref 42.7–76)
NRBC BLD AUTO-RTO: 0 /100 WBC (ref 0–0.2)
PLATELET # BLD AUTO: 196 10*3/MM3 (ref 140–450)
PMV BLD AUTO: 10.4 FL (ref 6–12)
POTASSIUM SERPL-SCNC: 4 MMOL/L (ref 3.5–5.2)
RBC # BLD AUTO: 4.84 10*6/MM3 (ref 3.77–5.28)
SODIUM SERPL-SCNC: 138 MMOL/L (ref 136–145)
WBC NRBC COR # BLD: 4.46 10*3/MM3 (ref 3.4–10.8)

## 2023-09-18 PROCEDURE — 80048 BASIC METABOLIC PNL TOTAL CA: CPT | Performed by: INTERNAL MEDICINE

## 2023-09-18 PROCEDURE — 99214 OFFICE O/P EST MOD 30 MIN: CPT | Performed by: PSYCHIATRY & NEUROLOGY

## 2023-09-18 PROCEDURE — G0378 HOSPITAL OBSERVATION PER HR: HCPCS

## 2023-09-18 PROCEDURE — 25010000002 ENOXAPARIN PER 10 MG: Performed by: INTERNAL MEDICINE

## 2023-09-18 PROCEDURE — 96372 THER/PROPH/DIAG INJ SC/IM: CPT

## 2023-09-18 PROCEDURE — 71046 X-RAY EXAM CHEST 2 VIEWS: CPT

## 2023-09-18 PROCEDURE — 83735 ASSAY OF MAGNESIUM: CPT | Performed by: INTERNAL MEDICINE

## 2023-09-18 PROCEDURE — 85025 COMPLETE CBC W/AUTO DIFF WBC: CPT | Performed by: INTERNAL MEDICINE

## 2023-09-18 RX ADMIN — ENOXAPARIN SODIUM 40 MG: 40 INJECTION SUBCUTANEOUS at 08:48

## 2023-09-18 RX ADMIN — ESCITALOPRAM OXALATE 10 MG: 10 TABLET ORAL at 08:48

## 2023-09-18 RX ADMIN — ARIPIPRAZOLE 15 MG: 15 TABLET ORAL at 08:49

## 2023-09-18 RX ADMIN — DOCUSATE SODIUM 50 MG AND SENNOSIDES 8.6 MG 2 TABLET: 8.6; 5 TABLET, FILM COATED ORAL at 08:48

## 2023-09-18 RX ADMIN — LEVOFLOXACIN 750 MG: 750 TABLET, FILM COATED ORAL at 12:26

## 2023-09-18 RX ADMIN — ESCITALOPRAM 5 MG: 5 TABLET, FILM COATED ORAL at 08:49

## 2023-09-18 NOTE — PROGRESS NOTES
[facility] Medicine   INPATIENT PROGRESS NOTE      Patient Name: Bailee Lora  Date of Admission: 9/16/2023  Today's Date: 09/18/23  Length of Stay: 0  Primary Care Physician: Provider, No Known    Subjective   Chief Complaint and HPI:  Patient with past medical history of schizophrenia, lupus, asthma presents with suicidal ideation. Patient extremely nice and personable. Patient states that she was in the park, but thought a car was chasing her and she started to run. Admits to suicidal ideation, and states that voices tell her to run, and that no one will ever love her or like her. Patient currently homeless, and lost custody of her child. Patient presented to emergency room for suicidal ideation assistance, but cannot be admitted to psychiatric floor due to COVID-positive status. On room air without signs of respiratory distress. Denies fevers, chills, sick contacts, recent travel, productive cough, headaches.     9/17/2023: Resting comfortably.  No respiratory distress.  No new complaints.    9/18/2023: Resting in bed.  Complains of mild dry cough earlier this morning when she first woke up but no persistent coughing throughout the day.  Continues to deny congestion and, rhinorrhea, fevers.      Review of Systems   Review of Systems as of 09/18/23   All pertinent negatives and positives are as above. All other systems have been reviewed and are negative unless otherwise stated.     Objective    Temp:  [98.2 °F (36.8 °C)-98.6 °F (37 °C)] 98.2 °F (36.8 °C)  Heart Rate:  [] 93  Resp:  [18] 18  BP: (100-122)/(51-64) 111/64  Physical Exam    Vitals and nursing note reviewed.   Constitutional:       General: No acute distress.     Appearance: Normal appearance.   HENT:      Head: Normocephalic and atraumatic.      Mouth: Mucous membranes are moist.   Eyes:      Conjunctivae/sclerae: Conjunctivae normal.      Pupils: Pupils are equal, round, and  reactive to light.   Cardiovascular:      Rate and Rhythm: Normal rate and regular rhythm.   Pulmonary:      Effort: Pulmonary effort is normal.      Breath sounds: Normal breath sounds.   Abdominal:      General: Abdomen is flat.      Palpations: Abdomen is soft.      Tenderness: No abdominal tenderness.   Genitourinary:     General:   normal  Musculoskeletal:         General: No signs of injury. Normal range of motion.   Skin:     General: Skin is warm and dry.   Neurological:      General: No focal deficit present.      Mental Status: Alert and oriented  Psychiatric:         Mood and Affect: Mood normal.         Behavior: Behavior normal.         Results Review:  I have reviewed the labs, radiology results, and diagnostic studies.    Laboratory Data:   Results from last 7 days   Lab Units 09/18/23 0700 09/16/23 2038 09/14/23 2025   WBC 10*3/mm3 4.46 4.70 4.59   HEMOGLOBIN g/dL 10.5* 10.5* 9.7*   HEMATOCRIT % 34.2 34.0 32.1*   PLATELETS 10*3/mm3 196 237 183          Results from last 7 days   Lab Units 09/18/23 0700 09/16/23 2038 09/14/23 2025   SODIUM mmol/L 138 137 142   POTASSIUM mmol/L 4.0 3.3* 4.0   CHLORIDE mmol/L 104 101 107   CO2 mmol/L 23.0 21.0* 27.0   BUN mg/dL 7 6 7   CREATININE mg/dL 0.85 0.94 0.81   CALCIUM mg/dL 8.6 8.7 8.5*   BILIRUBIN mg/dL  --  0.3 0.2   ALK PHOS U/L  --  70 59   ALT (SGPT) U/L  --  25 7   AST (SGOT) U/L  --  48* 16   GLUCOSE mg/dL 101* 102* 89         Culture Data:   Blood Culture   Date Value Ref Range Status   09/17/2023 No growth at 24 hours  Preliminary   09/17/2023 No growth at 24 hours  Preliminary     No results found for: URINECX  No results found for: RESPCX  No results found for: WOUNDCX  No results found for: STOOLCX  No components found for: BODYFLD    Radiology Data:   Imaging Results (Last 24 Hours)       ** No results found for the last 24 hours. **            I have reviewed the patient's current medications.     Assessment/Plan          Active Hospital  Problems    Diagnosis     **Schizophrenia     Suicidal behavior     Severe episode of recurrent major depressive disorder     Lab test positive for detection of COVID-19 virus        Medical Decision Making  Number and Complexity of problems: Multiple, complex    Conditions and Status:        Condition is unchanged.     [facility] Data  External documents reviewed: prior hospitalizations, ED notes, consultation notes  My EKG interpretation: None today  My  interpretation: None today  Tests considered but not ordered: None today       Discussed with: Patient, attending      Treatment Plan  As below    Care Planning  Shared decision making: With patient  Code status and discussions: Full code, CPR    Disposition  Social Determinants of Health that impact treatment or disposition: Homelessness  I expect the patient to be discharged in 1 to 4 days.    I confirmed that the patient's Advance Care Plan is present, code status is documented, or surrogate decision maker is listed in the patient's medical record.   ________________________________      Suicidal ideation and schizophrenia patient:  - Restart patient on Abilify, consult psychiatry, suicidal precautions, sitter at all times.    COVID-19:   9/17/2023: Asymptomatic  - 9/18/2023: Complaining of mild dry cough earlier this morning but not persistent throughout the day.  Chest x-ray shows possible patchy infrahilar infiltrates.  Currently oxygen levels within normal limits, no other symptoms.  Monitor closely for this.    UTI: Levaquin p.o.     Homelessness: Consult case management for disposition arrangements     Asthma: As needed inhalers  Lupus: Continue home management  PPx: Lovenox subcutaneous      Copied text in this note has been reviewed and is accurate as of 09/18/23       Electronically signed by Ivania Ojeda PA-C, 09/18/23, 09:11 CDT.

## 2023-09-18 NOTE — PLAN OF CARE
Goal Outcome Evaluation:      Patient continues to have sitter. No remarks made this shift. New order for chest xray. Awaiting results. Dry cough reported this am but no more this shift. V/S WNL. Patient is alert and oriented. No complaints of pain or concerns. No adverse reaction noted to oral atb therapy for uti. Continue plan of care as ordered.

## 2023-09-18 NOTE — PLAN OF CARE
"  Problem: Adult Inpatient Plan of Care  Goal: Plan of Care Review  Outcome: Ongoing, Progressing   Goal Outcome Evaluation:                      admit w/ SI, hx of schizophrenia/homeless. Incidental finding of COVID. Cardiac diet, intakes 9/17 50/75/100% and 75% breakfast. Received RN consult of pt  report of \"significant wt loss\". EPIC notes wt 2016 206# and # w/ BMI 36.4. Alb wnl. RD with no nutritional concerns at this time r/t intake/appetite and wt is appropriate/on higher side as long as pt has access to food. RD following.  "

## 2023-09-18 NOTE — CONSULTS
"Adult Nutrition  Assessment/PES    Patient Name:  Bailee Lora  YOB: 1995  MRN: [id number]  Admit Date:  9/16/2023    Assessment Date:  9/18/2023    Comments:  29yo female admit w/ SI, hx of schizophrenia/homeless. Incidental finding of COVID. Cardiac diet, intakes 9/17 50/75/100% and 75% breakfast. Received RN consult of pt  report of \"significant wt loss\". EPIC notes wt 2016 206# and # w/ BMI 36.4. Alb wnl. RD with no nutritional concerns at this time r/t intake/appetite and wt is appropriate/on higher side as long as pt has access to food. RD to follow hospital course.      Reason for Assessment       Row Name 09/18/23 1431          Reason for Assessment    Reason For Assessment nurse/nurse practitioner consult     Identified At Risk by Screening Criteria MST SCORE 2+;unintentional loss of 10 lbs or more in the past 2 mos                      Labs/Tests/Procedures/Meds       Row Name 09/18/23 1440          Labs/Procedures/Meds    Lab Results Reviewed reviewed     Lab Results Comments Glu 101, alb 4.3        Diagnostic Tests/Procedures    Diagnostic Test/Procedure Reviewed reviewed        Medications    Pertinent Medications Reviewed reviewed                      Estimated/Assessed Needs - Anthropometrics       Row Name 09/18/23 1441          Anthropometrics    Weight for Calculation 93 kg (205 lb)        Estimated/Assessed Needs    Additional Documentation Fluid Requirements (Group);Estimated Calorie Needs (Group);KCAL/KG (Group);Protein Requirements (Group)        Estimated Calorie Needs    Estimated Calorie Requirement (kcal/day) 1800        KCAL/KG    KCAL/KG 20 Kcal/Kg (kcal);18 Kcal/Kg (kcal)     18 Kcal/Kg (kcal) 1673.766     20 Kcal/Kg (kcal) 1859.74        Protein Requirements    Weight Used For Protein Calculations 93 kg (205 lb)     Est Protein Requirement Amount (gms/kg) 0.8 gm protein     Estimated Protein Requirements (gms/day) 74.39        Fluid Requirements    " Fluid Requirements (mL/day) 2000     RDA Method (mL) 2000                    Nutrition Prescription Ordered       Row Name 09/18/23 1441          Nutrition Prescription PO    Current PO Diet Regular     Common Modifiers Cardiac                    Evaluation of Received Nutrient/Fluid Intake       Row Name 09/18/23 1445          PO Evaluation    Number of Meals 4     % PO Intake 50x1, 75x1, 100x1                       Problem/Interventions:   Problem 1       Row Name 09/18/23 1447          Nutrition Diagnoses Problem 1    Problem 1 Predicted Suboptimal Intake     Etiology (related to) Medical Diagnosis     Infectious Disease Other (comment)  +COVID 19     Psychosocial Schizophrenia;Other (comment)                          Intervention Goal       Row Name 09/18/23 1454          Intervention Goal    General Maintain nutrition;Reduce/improve symptoms;Meet nutritional needs for age/condition     PO Establish PO;Meet estimated needs     Weight Maintain weight                    Nutrition Intervention       Row Name 09/18/23 1459          Nutrition Intervention    RD/Tech Action Care plan reviewd;Follow Tx progress                      Education/Evaluation       Row Name 09/18/23 1459          Education    Education Will Instruct as appropriate        Monitor/Evaluation    Monitor PO intake;Pertinent labs;Weight;Skin status;Symptoms     Education Follow-up Reinforce PRN                     Electronically signed by:  Lee Ann Delaney RD  09/18/23 14:59 CDT

## 2023-09-18 NOTE — NURSING NOTE
"Pt has been resting well between care, no statements made this shift regarding self harm to staff, Pt did mention to 1:1 sitter during rounding that \"I had a dream about dead people, \" then asked to watch cartoons.  "

## 2023-09-18 NOTE — PROGRESS NOTES
Psychiatry Progress Note    9/18/2023    Chief Complaint: suicidal ideation    Subjective:  Patient is a 28 y.o. female who was seen for suicidal ideation.    Patient seen on COVID isolation due to expressing suicidal thoughts and AH on presentation to the ED.    Patient reports that she has been having AH since she got out of [facility] on the 12th of September.  She notes that she was homeless after [facility] and her phone service had been cut off.      She was living with mom and her  and got an assault charge in March 2023 for trying to assault her mother.  She went to [facility] this and [facility] taking her friend's car on a douglas ride.  She was in [facility] for 84 days.  She notes that she could not go back to live with her mother possibly due to that being part of her conditions of release.    She got her mom to have someone pay for a cab.  The cab dropped her off at the food stamp office in [place] due to only having paid enough for that time period.  Patient notes that she was aiming to get to [place] where she has other family.  She has her paternal family and states she gets along better with them.    She planned to overdose on her pills b/c she did not know where she was or what to do.  She has not spoken with mom or family in [place].  She had no phone after getting out of [facility].  She does not have numbers for family members either.  There are two numbers for her mother in the chart but I did not get her mom when I called the numbers.    She reports that her AH is better.  She denies SI today b/c she is here and she has hope that we can help.  Notes paranoia from being in isolation.    Patient states that she received an IM of Abilify the day she was released from the [facility].    Objective     Vital Signs    Vitals:    09/18/23 0327 09/18/23 0733 09/18/23 1026 09/18/23 1424   BP: 122/63 111/64 128/60 112/58   BP Location: Left arm Left arm Right arm Right arm   Patient Position: Sitting Sitting Lying  Lying   Pulse: 81 93 81 76   Resp: 18 18 18 18   Temp: 98.4 °F (36.9 °C) 98.2 °F (36.8 °C) 98.4 °F (36.9 °C) 98.2 °F (36.8 °C)   TempSrc: Temporal Temporal Temporal Temporal   SpO2:  98% 98% 98%   Weight: 93.2 kg (205 lb 8 oz)      Height:           Physical Exam: as of today, 09/18/23   General Appearance: alert, pleasant, interactive, and cooperative,  Hygiene:   good  Gait & Station:  in bed  Musculoskeletal: No tremors or abnormal involuntary movements    Mental Status Exam: as of today, 09/18/23   Cooperation:  Cooperative  Eye Contact:  Good  Psychomotor Behavior:  Appropriate  Mood: Worried  Affect:  normal  Speech:  Normal  Thought Process:  Coherent  Associations: Circumstantial  Thought Content:     Normal   Suicidal:   Denies SI here b/c she is hopeful that we will help her.   Homicidal:  None   Hallucinations:  Not demonstrated today   Delusion:  Paranoid per patient's report.  Cognitive Functioning:   Consciousness: awake and alert  Reliability:   adequate  Insight:  Fair  Judgement:  Fair  Impulse Control:  Fair    Lab Results: Results source: EMR   Lab Results (last 24 hours)       Procedure Component Value Units Date/Time    Magnesium [[id number]]  (Normal) Collected: 09/18/23 0700    Specimen: Blood Updated: 09/18/23 0733     Magnesium 1.9 mg/dL     Basic Metabolic Panel [[id number]]  (Abnormal) Collected: 09/18/23 0700    Specimen: Blood Updated: 09/18/23 0733     Glucose 101 mg/dL      BUN 7 mg/dL      Creatinine 0.85 mg/dL      Sodium 138 mmol/L      Potassium 4.0 mmol/L      Chloride 104 mmol/L      CO2 23.0 mmol/L      Calcium 8.6 mg/dL      BUN/Creatinine Ratio 8.2     Anion Gap 11.0 mmol/L      eGFR 96.4 mL/min/1.73     Narrative:      GFR Normal >60  Chronic Kidney Disease <60  Kidney Failure <15      CBC & Differential [[id number]]  (Abnormal) Collected: 09/18/23 0700    Specimen: Blood Updated: 09/18/23 0726    Narrative:      The following orders were created for panel order CBC &  Differential.  Procedure                               Abnormality         Status                     ---------                               -----------         ------                     CBC Auto Differential[[id number]]        Abnormal            Final result               Scan Slide[[id number]]                                                                    Please view results for these tests on the individual orders.    CBC Auto Differential [[id number]]  (Abnormal) Collected: 09/18/23 0700    Specimen: Blood Updated: 09/18/23 0726     WBC 4.46 10*3/mm3      RBC 4.84 10*6/mm3      Hemoglobin 10.5 g/dL      Hematocrit 34.2 %      MCV 70.7 fL      MCH 21.7 pg      MCHC 30.7 g/dL      RDW 16.6 %      RDW-SD 42.2 fl      MPV 10.4 fL      Platelets 196 10*3/mm3      Neutrophil % 52.3 %      Lymphocyte % 39.5 %      Monocyte % 6.7 %      Eosinophil % 0.9 %      Basophil % 0.4 %      Immature Grans % 0.2 %      Neutrophils, Absolute 2.33 10*3/mm3      Lymphocytes, Absolute 1.76 10*3/mm3      Monocytes, Absolute 0.30 10*3/mm3      Eosinophils, Absolute 0.04 10*3/mm3      Basophils, Absolute 0.02 10*3/mm3      Immature Grans, Absolute 0.01 10*3/mm3      nRBC 0.0 /100 WBC     Blood Culture - Blood, Arm, Left [[id number]]  (Normal) Collected: 09/17/23 0646    Specimen: Blood from Arm, Left Updated: 09/18/23 0715     Blood Culture No growth at 24 hours    Blood Culture - Blood, Arm, Right [[id number]]  (Normal) Collected: 09/17/23 0646    Specimen: Blood from Arm, Right Updated: 09/18/23 0715     Blood Culture No growth at 24 hours            Radiology Results:  Imaging Results (Last 24 Hours)       Procedure Component Value Units Date/Time    XR Chest PA & Lateral [[id number]] Collected: 09/18/23 1240     Updated: 09/18/23 1244    Narrative:      Comparison:  None    FINDINGS:  Subtle patchy airspace opacities in the bilateral infrahilar lungs may represent  pneumonitis.  No pleural effusion or pneumothorax.   Cardiomediastinal silhouette  is unremarkable.              Medicine:   Current Facility-Administered Medications:     acetaminophen (TYLENOL) tablet 650 mg, 650 mg, Oral, Q4H PRN **OR** acetaminophen (TYLENOL) 160 MG/5ML oral solution 650 mg, 650 mg, Oral, Q4H PRN **OR** acetaminophen (TYLENOL) suppository 650 mg, 650 mg, Rectal, Q4H PRN, Freddy Pineda MD    albuterol (PROVENTIL) nebulizer solution 0.083% 2.5 mg/3mL, 2.5 mg, Nebulization, Q4H PRN, Freddy Pineda MD    ARIPiprazole (ABILIFY) tablet 15 mg, 15 mg, Oral, Daily, Nelida Blanchard APRN, 15 mg at 09/18/23 0849    sennosides-docusate (PERICOLACE) 8.6-50 MG per tablet 2 tablet, 2 tablet, Oral, BID, 2 tablet at 09/18/23 0848 **AND** polyethylene glycol (MIRALAX) packet 17 g, 17 g, Oral, Daily PRN **AND** bisacodyl (DULCOLAX) EC tablet 5 mg, 5 mg, Oral, Daily PRN **AND** bisacodyl (DULCOLAX) suppository 10 mg, 10 mg, Rectal, Daily PRN, Freddy Pineda MD    Calcium Replacement - Follow Nurse / BPA Driven Protocol, , Does not apply, PRN, Freddy Pineda MD    Enoxaparin Sodium (LOVENOX) syringe 40 mg, 40 mg, Subcutaneous, Daily, Freddy Pineda MD, 40 mg at 09/18/23 0848    escitalopram (LEXAPRO) tablet 5 mg, 5 mg, Oral, Daily, 5 mg at 09/18/23 0849 **AND** escitalopram (LEXAPRO) tablet 10 mg, 10 mg, Oral, Daily, Nelida Blanchard APRN, 10 mg at 09/18/23 0848    levoFLOXacin (LEVAQUIN) tablet 750 mg, 750 mg, Oral, Q24H, Ivania Ojeda PA-C, 750 mg at 09/18/23 1226    Magnesium Low Dose Replacement - Follow Nurse / BPA Driven Protocol, , Does not apply, PRN, Freddy Pineda MD    melatonin tablet 5 mg, 5 mg, Oral, Nightly PRN, Freddy Pineda MD, 5 mg at 09/17/23 2036    morphine injection 2 mg, 2 mg, Intravenous, Q4H PRN **AND** naloxone (NARCAN) injection 0.4 mg, 0.4 mg, Intravenous, Q5 Min PRN, Freddy Pineda MD    nitroglycerin (NITROSTAT) SL tablet 0.4 mg, 0.4 mg, Sublingual, Q5 Min PRN, Freddy Pineda MD    ondansetron (ZOFRAN) injection 4 mg, 4 mg,  Intravenous, Q6H Edwin CHANCE Enoch K, MD    Phosphorus Replacement - Follow Nurse / BPA Driven Protocol, , Does not apply, Edwin CHANCE Enoch K, MD    Potassium Replacement - Follow Nurse / BPA Driven Protocol, , Does not apply, Edwin CHANCE Enoch K, MD    sodium chloride 0.9 % flush 10 mL, 10 mL, Intravenous, Q12H, Freddy Pineda MD    sodium chloride 0.9 % flush 10 mL, 10 mL, Intravenous, PRNEdwin Enoch K, MD    sodium chloride 0.9 % infusion 40 mL, 40 mL, Intravenous, PREdwin PAYNE Enoch K, MD    Diagnoses/Assessment:     Schizophrenia    Suicidal ideations    Severe episode of recurrent major depressive disorder    Lab test positive for detection of COVID-19 virus      Treatment Plan:    Schizophrenia:   --Cont Abilify 15mg daily    Depression:   --Cont Lexapro 10mg daily   --Cont Abilify 15mg daily for augmentation    Suicidal ideations:   --Cont 1:1 to and suicide precautions.    COVID:   --Supportive care.    Disposition: Spoke with case management about patient's unusual predicament.  They will attempt to contact family to see if they can provide verification of history and assistance with getting the patient to a safe disposition.    Chiquita Gutierrez MD  09/18/23 at 15:09 CDT

## 2023-09-19 PROBLEM — R45.851 SUICIDAL IDEATIONS: Status: ACTIVE | Noted: 2023-09-17

## 2023-09-19 LAB
ANION GAP SERPL CALCULATED.3IONS-SCNC: 11 MMOL/L (ref 5–15)
BASOPHILS # BLD AUTO: 0.04 10*3/MM3 (ref 0–0.2)
BASOPHILS NFR BLD AUTO: 0.9 % (ref 0–1.5)
BUN SERPL-MCNC: 12 MG/DL (ref 6–20)
BUN/CREAT SERPL: 14.1 (ref 7–25)
CALCIUM SPEC-SCNC: 8.7 MG/DL (ref 8.6–10.5)
CHLORIDE SERPL-SCNC: 101 MMOL/L (ref 98–107)
CO2 SERPL-SCNC: 23 MMOL/L (ref 22–29)
CREAT SERPL-MCNC: 0.85 MG/DL (ref 0.57–1)
DEPRECATED RDW RBC AUTO: 41.6 FL (ref 37–54)
EGFRCR SERPLBLD CKD-EPI 2021: 96.4 ML/MIN/1.73
EOSINOPHIL # BLD AUTO: 0.05 10*3/MM3 (ref 0–0.4)
EOSINOPHIL NFR BLD AUTO: 1.1 % (ref 0.3–6.2)
ERYTHROCYTE [DISTWIDTH] IN BLOOD BY AUTOMATED COUNT: 16.5 % (ref 12.3–15.4)
GLUCOSE SERPL-MCNC: 82 MG/DL (ref 65–99)
HCT VFR BLD AUTO: 33.2 % (ref 34–46.6)
HGB BLD-MCNC: 10.2 G/DL (ref 12–15.9)
IMM GRANULOCYTES # BLD AUTO: 0.01 10*3/MM3 (ref 0–0.05)
IMM GRANULOCYTES NFR BLD AUTO: 0.2 % (ref 0–0.5)
LYMPHOCYTES # BLD AUTO: 1.78 10*3/MM3 (ref 0.7–3.1)
LYMPHOCYTES NFR BLD AUTO: 39.5 % (ref 19.6–45.3)
MAGNESIUM SERPL-MCNC: 2.1 MG/DL (ref 1.6–2.6)
MCH RBC QN AUTO: 21.7 PG (ref 26.6–33)
MCHC RBC AUTO-ENTMCNC: 30.7 G/DL (ref 31.5–35.7)
MCV RBC AUTO: 70.8 FL (ref 79–97)
MONOCYTES # BLD AUTO: 0.36 10*3/MM3 (ref 0.1–0.9)
MONOCYTES NFR BLD AUTO: 8 % (ref 5–12)
NEUTROPHILS NFR BLD AUTO: 2.27 10*3/MM3 (ref 1.7–7)
NEUTROPHILS NFR BLD AUTO: 50.3 % (ref 42.7–76)
NRBC BLD AUTO-RTO: 0 /100 WBC (ref 0–0.2)
PLATELET # BLD AUTO: 187 10*3/MM3 (ref 140–450)
PMV BLD AUTO: 10.4 FL (ref 6–12)
POTASSIUM SERPL-SCNC: 4 MMOL/L (ref 3.5–5.2)
RBC # BLD AUTO: 4.69 10*6/MM3 (ref 3.77–5.28)
SODIUM SERPL-SCNC: 135 MMOL/L (ref 136–145)
WBC NRBC COR # BLD: 4.51 10*3/MM3 (ref 3.4–10.8)

## 2023-09-19 PROCEDURE — 85025 COMPLETE CBC W/AUTO DIFF WBC: CPT | Performed by: INTERNAL MEDICINE

## 2023-09-19 PROCEDURE — 99214 OFFICE O/P EST MOD 30 MIN: CPT | Performed by: PSYCHIATRY & NEUROLOGY

## 2023-09-19 PROCEDURE — G0378 HOSPITAL OBSERVATION PER HR: HCPCS

## 2023-09-19 PROCEDURE — 80048 BASIC METABOLIC PNL TOTAL CA: CPT | Performed by: INTERNAL MEDICINE

## 2023-09-19 PROCEDURE — 83735 ASSAY OF MAGNESIUM: CPT | Performed by: INTERNAL MEDICINE

## 2023-09-19 PROCEDURE — 96372 THER/PROPH/DIAG INJ SC/IM: CPT

## 2023-09-19 PROCEDURE — 25010000002 ENOXAPARIN PER 10 MG: Performed by: INTERNAL MEDICINE

## 2023-09-19 RX ORDER — ARIPIPRAZOLE 10 MG/1
20 TABLET ORAL DAILY
Status: DISCONTINUED | OUTPATIENT
Start: 2023-09-20 | End: 2023-09-21 | Stop reason: HOSPADM

## 2023-09-19 RX ADMIN — DOCUSATE SODIUM 50 MG AND SENNOSIDES 8.6 MG 2 TABLET: 8.6; 5 TABLET, FILM COATED ORAL at 08:27

## 2023-09-19 RX ADMIN — ARIPIPRAZOLE 15 MG: 15 TABLET ORAL at 08:26

## 2023-09-19 RX ADMIN — ESCITALOPRAM OXALATE 10 MG: 10 TABLET ORAL at 08:26

## 2023-09-19 RX ADMIN — LEVOFLOXACIN 750 MG: 750 TABLET, FILM COATED ORAL at 11:38

## 2023-09-19 RX ADMIN — ENOXAPARIN SODIUM 40 MG: 40 INJECTION SUBCUTANEOUS at 08:26

## 2023-09-19 RX ADMIN — ESCITALOPRAM 5 MG: 5 TABLET, FILM COATED ORAL at 08:26

## 2023-09-19 NOTE — PROGRESS NOTES
[facility] Medicine   INPATIENT PROGRESS NOTE      Patient Name: Bailee Lora  Date of Admission: 9/16/2023  Today's Date: 09/19/23  Length of Stay: 0  Primary Care Physician: Provider, No Known    Subjective   Chief Complaint and HPI:  Patient with past medical history of schizophrenia, lupus, asthma presents with suicidal ideation. Patient extremely nice and personable. Patient states that she was in the park, but thought a car was chasing her and she started to run. Admits to suicidal ideation, and states that voices tell her to run, and that no one will ever love her or like her. Patient currently homeless, and lost custody of her child. Patient presented to emergency room for suicidal ideation assistance, but cannot be admitted to psychiatric floor due to COVID-positive status. On room air without signs of respiratory distress. Denies fevers, chills, sick contacts, recent travel, productive cough, headaches.     9/17/2023: Resting comfortably.  No respiratory distress.  No new complaints.    9/18/2023: Resting in bed.  Complains of mild dry cough earlier this morning when she first woke up but no persistent coughing throughout the day.  Continues to deny congestion and, rhinorrhea, fevers.    9/19/2023: Doing well today.  No complaints.  Denies cough today.      Review of Systems   Review of Systems as of 09/19/23   All pertinent negatives and positives are as above. All other systems have been reviewed and are negative unless otherwise stated.     Objective    Temp:  [98 °F (36.7 °C)-98.4 °F (36.9 °C)] 98.4 °F (36.9 °C)  Heart Rate:  [] 78  Resp:  [18] 18  BP: ()/(50-66) 91/50  Physical Exam    Vitals and nursing note reviewed.   Constitutional:       General: No acute distress.     Appearance: Normal appearance.   HENT:      Head: Normocephalic and atraumatic.      Mouth: Mucous membranes are moist.   Eyes:      Conjunctivae/sclerae:  Conjunctivae normal.      Pupils: Pupils are equal, round, and reactive to light.   Cardiovascular:      Rate and Rhythm: Normal rate and regular rhythm.   Pulmonary:      Effort: Pulmonary effort is normal.      Breath sounds: Normal breath sounds.   Abdominal:      General: Abdomen is flat.      Palpations: Abdomen is soft.      Tenderness: No abdominal tenderness.   Genitourinary:     General:   normal  Musculoskeletal:         General: No signs of injury. Normal range of motion.   Skin:     General: Skin is warm and dry.   Neurological:      General: No focal deficit present.      Mental Status: Alert and oriented  Psychiatric:         Mood and Affect: Mood normal.         Behavior: Behavior normal.         Results Review:  I have reviewed the labs, radiology results, and diagnostic studies.    Laboratory Data:   Results from last 7 days   Lab Units 09/19/23 0614 09/18/23 0700 09/16/23 2038   WBC 10*3/mm3 4.51 4.46 4.70   HEMOGLOBIN g/dL 10.2* 10.5* 10.5*   HEMATOCRIT % 33.2* 34.2 34.0   PLATELETS 10*3/mm3 187 196 237          Results from last 7 days   Lab Units 09/19/23 0614 09/18/23 0700 09/16/23 2038 09/14/23 2025   SODIUM mmol/L 135* 138 137 142   POTASSIUM mmol/L 4.0 4.0 3.3* 4.0   CHLORIDE mmol/L 101 104 101 107   CO2 mmol/L 23.0 23.0 21.0* 27.0   BUN mg/dL 12 7 6 7   CREATININE mg/dL 0.85 0.85 0.94 0.81   CALCIUM mg/dL 8.7 8.6 8.7 8.5*   BILIRUBIN mg/dL  --   --  0.3 0.2   ALK PHOS U/L  --   --  70 59   ALT (SGPT) U/L  --   --  25 7   AST (SGOT) U/L  --   --  48* 16   GLUCOSE mg/dL 82 101* 102* 89         Culture Data:   Blood Culture   Date Value Ref Range Status   09/17/2023 No growth at 2 days  Preliminary   09/17/2023 No growth at 2 days  Preliminary     No results found for: URINECX  No results found for: RESPCX  No results found for: WOUNDCX  No results found for: STOOLCX  No components found for: BODYFLD    Radiology Data:   Imaging Results (Last 24 Hours)       Procedure Component Value  Units Date/Time    XR Chest PA & Lateral [[id number]] Collected: 09/18/23 1240     Updated: 09/18/23 1244    Narrative:      Comparison:  None    FINDINGS:  Subtle patchy airspace opacities in the bilateral infrahilar lungs may represent  pneumonitis.  No pleural effusion or pneumothorax.  Cardiomediastinal silhouette  is unremarkable.              I have reviewed the patient's current medications.     Assessment/Plan          Active Hospital Problems    Diagnosis     **Schizophrenia     Suicidal behavior     Severe episode of recurrent major depressive disorder     Lab test positive for detection of COVID-19 virus        Medical Decision Making  Number and Complexity of problems: Multiple, complex    Conditions and Status:        Condition is unchanged.     MDM Data  External documents reviewed: prior hospitalizations, ED notes, consultation notes  My EKG interpretation: None today  My  interpretation: None today  Tests considered but not ordered: None today       Discussed with: Patient, attending      Treatment Plan  As below    Care Planning  Shared decision making: With patient  Code status and discussions: Full code, CPR    Disposition  Social Determinants of Health that impact treatment or disposition: Homelessness  I expect the patient to be discharged in 1 to 4 days.    I confirmed that the patient's Advance Care Plan is present, code status is documented, or surrogate decision maker is listed in the patient's medical record.   ________________________________      Suicidal ideation and schizophrenia patient:  - Restart patient on Abilify, consult psychiatry, suicidal precautions, sitter at all times.    COVID-19:   9/17/2023: Asymptomatic  - 9/18/2023: Complaining of mild dry cough earlier this morning but not persistent throughout the day.  Chest x-ray shows possible patchy infrahilar infiltrates.  Currently oxygen levels within normal limits, no other symptoms.  Monitor closely for this.  9/19/2023: No  changes since yesterday, on room air, denies cough and other respiratory symptoms today.    UTI: Levaquin p.o. blood cultures negative at 2 days on 9/19/2023.     Homelessness: Consult case management for disposition arrangements     Asthma: As needed inhalers  Lupus: Continue home management  PPx: Lovenox subcutaneous      Copied text in this note has been reviewed and is accurate as of 09/19/23       Electronically signed by Ivania Ojeda PA-C, 09/19/23, 09:35 CDT.

## 2023-09-19 NOTE — PLAN OF CARE
Goal Outcome Evaluation:  Plan of Care Reviewed With: patient        Progress: improving  Outcome Evaluation: VSS. No acute changes this shift. Remains on room air.  present at bedside. No SI/HI expressed this shift.

## 2023-09-19 NOTE — NURSING NOTE
New order to d/c sitter per [facility]. Increased Abilify to 20 mg. Continue to monitor for any adverse reaction.

## 2023-09-19 NOTE — PLAN OF CARE
Goal Outcome Evaluation:   Patient continues with sitter. No comments or changes in mood at this time. V/S WNL. Continues on oral Levaquin for uti. No adverse reaction. Continues to have no IV assess. Continue plan of care as ordered at this time.

## 2023-09-19 NOTE — PROGRESS NOTES
Psychiatry Progress Note    9/19/2023    Chief Complaint: suicidal ideation    Subjective:  Patient is a 28 y.o. female who was seen for suicidal ideation.    Patient seen on COVID isolation due to expressing suicidal thoughts and AH on presentation to the ED.    Patient reports that her AVH is better but not completely resolved.  She notes no AH but having some VH of seeing lights.  She notes feeling paranoia but her description is of anxiety from being confined to the room.    Patient denies any suicidal thoughts today as well.  She notes hope from being in the hospital having care and hope that she will be provided help.    Case management has not been able to get a hold of any family.    Objective     Vital Signs    Vitals:    09/19/23 0412 09/19/23 0657 09/19/23 1033 09/19/23 1433   BP: 105/66 91/50 101/59 105/60   BP Location: Left arm Right arm Right arm Right arm   Patient Position: Sitting Lying Lying Lying   Pulse: 102 78 78 74   Resp: 18 18 18 18   Temp: 98.4 °F (36.9 °C) 98.4 °F (36.9 °C) 98.4 °F (36.9 °C) 98.4 °F (36.9 °C)   TempSrc: Temporal Temporal Temporal Temporal   SpO2: 97% 97% 98% 98%   Weight: 94.7 kg (208 lb 12.8 oz)      Height:           Physical Exam: as of today, 09/19/23   General Appearance: alert, pleasant, interactive, and cooperative,  Hygiene:   good  Gait & Station:  in bed  Musculoskeletal: No tremors or abnormal involuntary movements    Mental Status Exam: as of today, 09/19/23   Cooperation:  Cooperative  Eye Contact:  Good  Psychomotor Behavior:  Appropriate  Mood: Worried  Affect:  normal  Speech:  Normal  Thought Process:  Coherent  Associations: Circumstantial  Thought Content:     Normal   Suicidal:   Denies SI here b/c she is hopeful that we will help her.   Homicidal:  None   Hallucinations:  Visual minimal, intermittent   Delusion:  None  Cognitive Functioning:   Consciousness: awake and alert  Reliability:   adequate  Insight:  Fair  Judgement:  Fair  Impulse Control:   Fair    Lab Results: Results source: EMR   Lab Results (last 24 hours)       Procedure Component Value Units Date/Time    Basic Metabolic Panel [[id number]]  (Abnormal) Collected: 09/19/23 0614    Specimen: Blood Updated: 09/19/23 0728     Glucose 82 mg/dL      BUN 12 mg/dL      Creatinine 0.85 mg/dL      Sodium 135 mmol/L      Potassium 4.0 mmol/L      Chloride 101 mmol/L      CO2 23.0 mmol/L      Calcium 8.7 mg/dL      BUN/Creatinine Ratio 14.1     Anion Gap 11.0 mmol/L      eGFR 96.4 mL/min/1.73     Narrative:      GFR Normal >60  Chronic Kidney Disease <60  Kidney Failure <15      Magnesium [[id number]]  (Normal) Collected: 09/19/23 0614    Specimen: Blood Updated: 09/19/23 0727     Magnesium 2.1 mg/dL     Blood Culture - Blood, Arm, Left [[id number]]  (Normal) Collected: 09/17/23 0646    Specimen: Blood from Arm, Left Updated: 09/19/23 0715     Blood Culture No growth at 2 days    Blood Culture - Blood, Arm, Right [[id number]]  (Normal) Collected: 09/17/23 0646    Specimen: Blood from Arm, Right Updated: 09/19/23 0715     Blood Culture No growth at 2 days    CBC & Differential [[id number]]  (Abnormal) Collected: 09/19/23 0614    Specimen: Blood Updated: 09/19/23 0641    Narrative:      The following orders were created for panel order CBC & Differential.  Procedure                               Abnormality         Status                     ---------                               -----------         ------                     CBC Auto Differential[[id number]]        Abnormal            Final result               Scan Slide[[id number]]                                                                    Please view results for these tests on the individual orders.    CBC Auto Differential [[id number]]  (Abnormal) Collected: 09/19/23 0614    Specimen: Blood Updated: 09/19/23 0639     WBC 4.51 10*3/mm3      RBC 4.69 10*6/mm3      Hemoglobin 10.2 g/dL      Hematocrit 33.2 %      MCV 70.8 fL      MCH 21.7 pg      MCHC 30.7  g/dL      RDW 16.5 %      RDW-SD 41.6 fl      MPV 10.4 fL      Platelets 187 10*3/mm3      Neutrophil % 50.3 %      Lymphocyte % 39.5 %      Monocyte % 8.0 %      Eosinophil % 1.1 %      Basophil % 0.9 %      Immature Grans % 0.2 %      Neutrophils, Absolute 2.27 10*3/mm3      Lymphocytes, Absolute 1.78 10*3/mm3      Monocytes, Absolute 0.36 10*3/mm3      Eosinophils, Absolute 0.05 10*3/mm3      Basophils, Absolute 0.04 10*3/mm3      Immature Grans, Absolute 0.01 10*3/mm3      nRBC 0.0 /100 WBC             Radiology Results:  Imaging Results (Last 24 Hours)       ** No results found for the last 24 hours. **            Medicine:   Current Facility-Administered Medications:     acetaminophen (TYLENOL) tablet 650 mg, 650 mg, Oral, Q4H PRN **OR** acetaminophen (TYLENOL) 160 MG/5ML oral solution 650 mg, 650 mg, Oral, Q4H PRN **OR** acetaminophen (TYLENOL) suppository 650 mg, 650 mg, Rectal, Q4H PRN, Freddy Pineda MD    albuterol (PROVENTIL) nebulizer solution 0.083% 2.5 mg/3mL, 2.5 mg, Nebulization, Q4H PRN, Freddy Pineda MD    ARIPiprazole (ABILIFY) tablet 15 mg, 15 mg, Oral, Daily, Nelida Blanchard L, APRN, 15 mg at 09/19/23 0826    sennosides-docusate (PERICOLACE) 8.6-50 MG per tablet 2 tablet, 2 tablet, Oral, BID, 2 tablet at 09/19/23 0827 **AND** polyethylene glycol (MIRALAX) packet 17 g, 17 g, Oral, Daily PRN **AND** bisacodyl (DULCOLAX) EC tablet 5 mg, 5 mg, Oral, Daily PRN **AND** bisacodyl (DULCOLAX) suppository 10 mg, 10 mg, Rectal, Daily PRN, Freddy Pineda MD    Calcium Replacement - Follow Nurse / BPA Driven Protocol, , Does not apply, PRN, Freddy Pineda MD    Enoxaparin Sodium (LOVENOX) syringe 40 mg, 40 mg, Subcutaneous, Daily, Freddy Pineda MD, 40 mg at 09/19/23 0826    escitalopram (LEXAPRO) tablet 5 mg, 5 mg, Oral, Daily, 5 mg at 09/19/23 0826 **AND** escitalopram (LEXAPRO) tablet 10 mg, 10 mg, Oral, Daily, Nelida Blanchard, APRN, 10 mg at 09/19/23 0826    levoFLOXacin (LEVAQUIN) tablet 750 mg, 750  mg, Oral, Q24H, Ivania Ojeda PA-C, 750 mg at 09/19/23 1138    Magnesium Low Dose Replacement - Follow Nurse / BPA Driven Protocol, , Does not apply, Edwin CHANCE Enoch K, MD    melatonin tablet 5 mg, 5 mg, Oral, Nightly PRN, Freddy Pineda MD, 5 mg at 09/17/23 2036    morphine injection 2 mg, 2 mg, Intravenous, Q4H PRN **AND** naloxone (NARCAN) injection 0.4 mg, 0.4 mg, Intravenous, Q5 Min PRN, Freddy Pineda MD    nitroglycerin (NITROSTAT) SL tablet 0.4 mg, 0.4 mg, Sublingual, Q5 Min PRN, Freddy Pineda MD    ondansetron (ZOFRAN) injection 4 mg, 4 mg, Intravenous, Q6H PRN, Freddy Pineda MD    Phosphorus Replacement - Follow Nurse / BPA Driven Protocol, , Does not apply, Edwin CHANCE Enoch K, MD    Potassium Replacement - Follow Nurse / BPA Driven Protocol, , Does not apply, Edwin CHANCE Enoch K, MD    sodium chloride 0.9 % flush 10 mL, 10 mL, Intravenous, Q12H, Freddy Pineda MD    sodium chloride 0.9 % flush 10 mL, 10 mL, Intravenous, PRN, Freddy Pineda MD    sodium chloride 0.9 % infusion 40 mL, 40 mL, Intravenous, PRN, Freddy Pineda MD    Diagnoses/Assessment:     Schizophrenia    Suicidal ideations    Severe episode of recurrent major depressive disorder    Lab test positive for detection of COVID-19 virus      Treatment Plan:    Schizophrenia:   --Increase Abilify to 20mg daily    Depression:   --Cont Lexapro 10mg daily   --Increase Abilify to 20mg daily for augmentation    Suicidal ideations:   --Discontinue 1:1 to and suicide precautions.    COVID:   --Supportive care.    Disposition: Spoke with case management about patient's unusual predicament.  They will attempt to contact family to see if they can provide verification of history and assistance with getting the patient to a safe disposition.    Chiquita Gutierrez MD  09/19/23 at 17:29 CDT

## 2023-09-20 LAB
ANION GAP SERPL CALCULATED.3IONS-SCNC: 10 MMOL/L (ref 5–15)
BASOPHILS # BLD AUTO: 0.03 10*3/MM3 (ref 0–0.2)
BASOPHILS NFR BLD AUTO: 0.7 % (ref 0–1.5)
BUN SERPL-MCNC: 11 MG/DL (ref 6–20)
BUN/CREAT SERPL: 12.9 (ref 7–25)
CALCIUM SPEC-SCNC: 8.4 MG/DL (ref 8.6–10.5)
CHLORIDE SERPL-SCNC: 104 MMOL/L (ref 98–107)
CO2 SERPL-SCNC: 25 MMOL/L (ref 22–29)
CREAT SERPL-MCNC: 0.85 MG/DL (ref 0.57–1)
DEPRECATED RDW RBC AUTO: 42.4 FL (ref 37–54)
EGFRCR SERPLBLD CKD-EPI 2021: 96.4 ML/MIN/1.73
EOSINOPHIL # BLD AUTO: 0.05 10*3/MM3 (ref 0–0.4)
EOSINOPHIL NFR BLD AUTO: 1.2 % (ref 0.3–6.2)
ERYTHROCYTE [DISTWIDTH] IN BLOOD BY AUTOMATED COUNT: 16.5 % (ref 12.3–15.4)
GLUCOSE SERPL-MCNC: 86 MG/DL (ref 65–99)
HCT VFR BLD AUTO: 33.1 % (ref 34–46.6)
HGB BLD-MCNC: 10 G/DL (ref 12–15.9)
IMM GRANULOCYTES # BLD AUTO: 0.01 10*3/MM3 (ref 0–0.05)
IMM GRANULOCYTES NFR BLD AUTO: 0.2 % (ref 0–0.5)
LYMPHOCYTES # BLD AUTO: 1.59 10*3/MM3 (ref 0.7–3.1)
LYMPHOCYTES NFR BLD AUTO: 36.6 % (ref 19.6–45.3)
MAGNESIUM SERPL-MCNC: 2.1 MG/DL (ref 1.6–2.6)
MCH RBC QN AUTO: 21.6 PG (ref 26.6–33)
MCHC RBC AUTO-ENTMCNC: 30.2 G/DL (ref 31.5–35.7)
MCV RBC AUTO: 71.6 FL (ref 79–97)
MONOCYTES # BLD AUTO: 0.32 10*3/MM3 (ref 0.1–0.9)
MONOCYTES NFR BLD AUTO: 7.4 % (ref 5–12)
NEUTROPHILS NFR BLD AUTO: 2.34 10*3/MM3 (ref 1.7–7)
NEUTROPHILS NFR BLD AUTO: 53.9 % (ref 42.7–76)
NRBC BLD AUTO-RTO: 0 /100 WBC (ref 0–0.2)
PLATELET # BLD AUTO: 195 10*3/MM3 (ref 140–450)
PMV BLD AUTO: 10.4 FL (ref 6–12)
POTASSIUM SERPL-SCNC: 4.1 MMOL/L (ref 3.5–5.2)
RBC # BLD AUTO: 4.62 10*6/MM3 (ref 3.77–5.28)
SODIUM SERPL-SCNC: 139 MMOL/L (ref 136–145)
WBC NRBC COR # BLD: 4.34 10*3/MM3 (ref 3.4–10.8)

## 2023-09-20 PROCEDURE — G0378 HOSPITAL OBSERVATION PER HR: HCPCS

## 2023-09-20 PROCEDURE — 25010000002 ENOXAPARIN PER 10 MG: Performed by: INTERNAL MEDICINE

## 2023-09-20 PROCEDURE — 80048 BASIC METABOLIC PNL TOTAL CA: CPT | Performed by: INTERNAL MEDICINE

## 2023-09-20 PROCEDURE — 99214 OFFICE O/P EST MOD 30 MIN: CPT

## 2023-09-20 PROCEDURE — 83735 ASSAY OF MAGNESIUM: CPT | Performed by: INTERNAL MEDICINE

## 2023-09-20 PROCEDURE — 96372 THER/PROPH/DIAG INJ SC/IM: CPT

## 2023-09-20 PROCEDURE — 85025 COMPLETE CBC W/AUTO DIFF WBC: CPT | Performed by: INTERNAL MEDICINE

## 2023-09-20 RX ADMIN — ENOXAPARIN SODIUM 40 MG: 40 INJECTION SUBCUTANEOUS at 08:42

## 2023-09-20 RX ADMIN — DOCUSATE SODIUM 50 MG AND SENNOSIDES 8.6 MG 2 TABLET: 8.6; 5 TABLET, FILM COATED ORAL at 08:42

## 2023-09-20 RX ADMIN — ESCITALOPRAM OXALATE 10 MG: 10 TABLET ORAL at 08:42

## 2023-09-20 RX ADMIN — ARIPIPRAZOLE 20 MG: 10 TABLET ORAL at 08:42

## 2023-09-20 RX ADMIN — LEVOFLOXACIN 750 MG: 750 TABLET, FILM COATED ORAL at 10:58

## 2023-09-20 NOTE — PLAN OF CARE
Goal Outcome Evaluation:  Plan of Care Reviewed With: patient        Progress: improving  Outcome Evaluation: VSS. No acute changes this shift. Patient denies shortness of air or discomfort, no cough. Patient did set up code word this shift. Patient also stated she spoke with her mother recently. Requested snack, given.

## 2023-09-20 NOTE — PROGRESS NOTES
[facility] Medicine   INPATIENT PROGRESS NOTE      Patient Name: Bailee Lora  Date of Admission: 9/16/2023  Today's Date: 09/20/23  Length of Stay: 0  Primary Care Physician: Provider, No Known    Subjective   Chief Complaint and HPI:  Patient with past medical history of schizophrenia, lupus, asthma presents with suicidal ideation. Patient extremely nice and personable. Patient states that she was in the park, but thought a car was chasing her and she started to run. Admits to suicidal ideation, and states that voices tell her to run, and that no one will ever love her or like her. Patient currently homeless, and lost custody of her child. Patient presented to emergency room for suicidal ideation assistance, but cannot be admitted to psychiatric floor due to COVID-positive status. On room air without signs of respiratory distress. Denies fevers, chills, sick contacts, recent travel, productive cough, headaches.     9/17/2023: Resting comfortably.  No respiratory distress.  No new complaints.    9/18/2023: Resting in bed.  Complains of mild dry cough earlier this morning when she first woke up but no persistent coughing throughout the day.  Continues to deny congestion and, rhinorrhea, fevers.    9/19/2023: Doing well today.  No complaints.  Denies cough today.    9/20/2023: No complaints today.  Denies upper respiratory infection symptoms.    Review of Systems   Review of Systems as of 09/20/23   All pertinent negatives and positives are as above. All other systems have been reviewed and are negative unless otherwise stated.     Objective    Temp:  [98 °F (36.7 °C)-98.6 °F (37 °C)] 98 °F (36.7 °C)  Heart Rate:  [72-78] 72  Resp:  [18] 18  BP: (101-120)/(56-60) 120/56  Physical Exam    Vitals and nursing note reviewed.   Constitutional:       General: No acute distress.     Appearance: Normal appearance.   HENT:      Head: Normocephalic and atraumatic.       Mouth: Mucous membranes are moist.   Eyes:      Conjunctivae/sclerae: Conjunctivae normal.      Pupils: Pupils are equal, round, and reactive to light.   Cardiovascular:      Rate and Rhythm: Normal rate and regular rhythm.   Pulmonary:      Effort: Pulmonary effort is normal.      Breath sounds: Normal breath sounds.   Abdominal:      General: Abdomen is flat.      Palpations: Abdomen is soft.      Tenderness: No abdominal tenderness.   Genitourinary:     General:   normal  Musculoskeletal:         General: No signs of injury. Normal range of motion.   Skin:     General: Skin is warm and dry.   Neurological:      General: No focal deficit present.      Mental Status: Alert and oriented  Psychiatric:         Mood and Affect: Mood normal.         Behavior: Behavior normal.         Results Review:  I have reviewed the labs, radiology results, and diagnostic studies.    Laboratory Data:   Results from last 7 days   Lab Units 09/20/23  0642 09/19/23  0614 09/18/23  0700   WBC 10*3/mm3 4.34 4.51 4.46   HEMOGLOBIN g/dL 10.0* 10.2* 10.5*   HEMATOCRIT % 33.1* 33.2* 34.2   PLATELETS 10*3/mm3 195 187 196          Results from last 7 days   Lab Units 09/20/23  0642 09/19/23  0614 09/18/23  0700 09/16/23 2038 09/14/23 2025   SODIUM mmol/L 139 135* 138 137 142   POTASSIUM mmol/L 4.1 4.0 4.0 3.3* 4.0   CHLORIDE mmol/L 104 101 104 101 107   CO2 mmol/L 25.0 23.0 23.0 21.0* 27.0   BUN mg/dL 11 12 7 6 7   CREATININE mg/dL 0.85 0.85 0.85 0.94 0.81   CALCIUM mg/dL 8.4* 8.7 8.6 8.7 8.5*   BILIRUBIN mg/dL  --   --   --  0.3 0.2   ALK PHOS U/L  --   --   --  70 59   ALT (SGPT) U/L  --   --   --  25 7   AST (SGOT) U/L  --   --   --  48* 16   GLUCOSE mg/dL 86 82 101* 102* 89         Culture Data:   No results found for: BLOODCX    No results found for: URINECX  No results found for: RESPCX  No results found for: WOUNDCX  No results found for: STOOLCX  No components found for: BODYFLD    Radiology Data:   Imaging Results (Last 24  Hours)       ** No results found for the last 24 hours. **            I have reviewed the patient's current medications.     Assessment/Plan          Active Hospital Problems    Diagnosis     **Schizophrenia     Suicidal ideations     Severe episode of recurrent major depressive disorder     Lab test positive for detection of COVID-19 virus        Medical Decision Making  Number and Complexity of problems: Multiple, complex    Conditions and Status:        Condition is unchanged.     MDM Data  External documents reviewed: prior hospitalizations, ED notes, consultation notes  My EKG interpretation: None today  My  interpretation: None today  Tests considered but not ordered: None today       Discussed with: Patient, attending      Treatment Plan  As below    Care Planning  Shared decision making: With patient  Code status and discussions: Full code, CPR    Disposition  Social Determinants of Health that impact treatment or disposition: Homelessness  I expect the patient to be discharged in 1 to 4 days.    I confirmed that the patient's Advance Care Plan is present, code status is documented, or surrogate decision maker is listed in the patient's medical record.   ________________________________      Suicidal ideation and schizophrenia patient:  - Restart patient on Abilify, consult psychiatry, suicidal precautions, sitter at all times.    COVID-19:   9/17/2023: Asymptomatic  - 9/18/2023: Complaining of mild dry cough earlier this morning but not persistent throughout the day.  Chest x-ray shows possible patchy infrahilar infiltrates.  Currently oxygen levels within normal limits, no other symptoms.  Monitor closely for this.  9/19/2023: No changes since yesterday, on room air, denies cough and other respiratory symptoms today.    UTI: Levaquin p.o. blood cultures negative at 2 days on 9/19/2023.     Homelessness: Consult case management for disposition arrangements     Asthma: As needed inhalers  Lupus: Continue  home management  PPx: Lovenox subcutaneous      Copied text in this note has been reviewed and is accurate as of 09/20/23       Electronically signed by Ivania Ojeda PA-C, 09/20/23, 09:32 CDT.

## 2023-09-20 NOTE — PLAN OF CARE
Goal Outcome Evaluation:      No change in current plan of care this shift. Continue as ordered.

## 2023-09-20 NOTE — PROGRESS NOTES
"Psychiatry Progress Note    9/20/2023      Chief Complaint: suicidal ideation    Subjective:  Patient is a 28 y.o. female who was hospitalized for suicidal ideation.    Patient is seen on Covid isolation due to expressing SI and AH when presenting to the ED.     Patient denies SI and states she is no longer having AVH. Patient states she continues to feel anxiety related to be in isolation.     Patient states she is agreeable to discharge home with her mother in [place]. Per conversation with Maria Del Carmen in case management, patient's mother is agreeable to her daughter returning home with her upon discharge.         Objective     Vital Signs    Vitals:    09/20/23 0354 09/20/23 0700 09/20/23 1146 09/20/23 1421   BP: 105/56 120/56 99/56 106/55   BP Location: Left arm Left arm Left arm Left arm   Patient Position: Sitting Lying Lying Sitting   Pulse:  72 66 85   Resp: 18 18 18 18   Temp: 98.6 °F (37 °C) 98 °F (36.7 °C) 98.4 °F (36.9 °C) 97.7 °F (36.5 °C)   TempSrc: Temporal Temporal Temporal Temporal   SpO2: 97% 96% 96% 97%   Weight: 91.4 kg (201 lb 6.4 oz)      Height:           Physical Exam: as of today, 09/20/23   General Appearance: alert, appears stated age, and cooperative,  Hygiene:    adequate  Gait & Station:  deferred, in bed  Musculoskeletal: No tremors or abnormal involuntary movements    Mental Status Exam: as of today, 09/20/23   Cooperation:  Cooperative  Eye Contact:  Good  Psychomotor Behavior:  Appropriate  Mood: \"Fine\"  Affect:  mood-congruent  Speech:  Normal  Thought Process:  Coherent  Associations: Circumstantial  Thought Content:     Mood congruent   Suicidal:   Denies   Homicidal:  None   Hallucinations:   Denies   Delusion:  None  Cognitive Functioning:   Consciousness: awake and alert  Reliability:   adequate  Insight:  Fair  Judgement:  Fair  Impulse Control:  Fair    Lab Results: Results source: EMR   Lab Results (last 24 hours)       Procedure Component Value Units Date/Time    Magnesium " [[id number]]  (Normal) Collected: 09/20/23 0642    Specimen: Blood Updated: 09/20/23 0729     Magnesium 2.1 mg/dL     Basic Metabolic Panel [[id number]]  (Abnormal) Collected: 09/20/23 0642    Specimen: Blood Updated: 09/20/23 0729     Glucose 86 mg/dL      BUN 11 mg/dL      Creatinine 0.85 mg/dL      Sodium 139 mmol/L      Potassium 4.1 mmol/L      Chloride 104 mmol/L      CO2 25.0 mmol/L      Calcium 8.4 mg/dL      BUN/Creatinine Ratio 12.9     Anion Gap 10.0 mmol/L      eGFR 96.4 mL/min/1.73     Narrative:      GFR Normal >60  Chronic Kidney Disease <60  Kidney Failure <15      CBC & Differential [[id number]]  (Abnormal) Collected: 09/20/23 0642    Specimen: Blood Updated: 09/20/23 0718    Narrative:      The following orders were created for panel order CBC & Differential.  Procedure                               Abnormality         Status                     ---------                               -----------         ------                     CBC Auto Differential[[id number]]        Abnormal            Final result               Scan Slide[[id number]]                                                                    Please view results for these tests on the individual orders.    CBC Auto Differential [[id number]]  (Abnormal) Collected: 09/20/23 0642    Specimen: Blood Updated: 09/20/23 0717     WBC 4.34 10*3/mm3      RBC 4.62 10*6/mm3      Hemoglobin 10.0 g/dL      Hematocrit 33.1 %      MCV 71.6 fL      MCH 21.6 pg      MCHC 30.2 g/dL      RDW 16.5 %      RDW-SD 42.4 fl      MPV 10.4 fL      Platelets 195 10*3/mm3      Neutrophil % 53.9 %      Lymphocyte % 36.6 %      Monocyte % 7.4 %      Eosinophil % 1.2 %      Basophil % 0.7 %      Immature Grans % 0.2 %      Neutrophils, Absolute 2.34 10*3/mm3      Lymphocytes, Absolute 1.59 10*3/mm3      Monocytes, Absolute 0.32 10*3/mm3      Eosinophils, Absolute 0.05 10*3/mm3      Basophils, Absolute 0.03 10*3/mm3      Immature Grans, Absolute 0.01 10*3/mm3      nRBC 0.0  /100 WBC     Blood Culture - Blood, Arm, Left [[id number]]  (Normal) Collected: 09/17/23 0646    Specimen: Blood from Arm, Left Updated: 09/20/23 0715     Blood Culture No growth at 3 days    Blood Culture - Blood, Arm, Right [[id number]]  (Normal) Collected: 09/17/23 0646    Specimen: Blood from Arm, Right Updated: 09/20/23 0715     Blood Culture No growth at 3 days            Radiology Results:  Imaging Results (Last 24 Hours)       ** No results found for the last 24 hours. **            Medicine:   Current Facility-Administered Medications:     acetaminophen (TYLENOL) tablet 650 mg, 650 mg, Oral, Q4H PRN **OR** acetaminophen (TYLENOL) 160 MG/5ML oral solution 650 mg, 650 mg, Oral, Q4H PRN **OR** acetaminophen (TYLENOL) suppository 650 mg, 650 mg, Rectal, Q4H PRN, Freddy Pineda MD    albuterol (PROVENTIL) nebulizer solution 0.083% 2.5 mg/3mL, 2.5 mg, Nebulization, Q4H PRN, Freddy Pineda MD    ARIPiprazole (ABILIFY) tablet 20 mg, 20 mg, Oral, Daily, Chiquita Gutierrez MD, 20 mg at 09/20/23 0842    sennosides-docusate (PERICOLACE) 8.6-50 MG per tablet 2 tablet, 2 tablet, Oral, BID, 2 tablet at 09/20/23 0842 **AND** polyethylene glycol (MIRALAX) packet 17 g, 17 g, Oral, Daily PRN **AND** bisacodyl (DULCOLAX) EC tablet 5 mg, 5 mg, Oral, Daily PRN **AND** bisacodyl (DULCOLAX) suppository 10 mg, 10 mg, Rectal, Daily PRN, Freddy Pineda MD    Calcium Replacement - Follow Nurse / BPA Driven Protocol, , Does not apply, PRN, Freddy Pineda MD    Enoxaparin Sodium (LOVENOX) syringe 40 mg, 40 mg, Subcutaneous, Daily, Freddy Pineda MD, 40 mg at 09/20/23 0842    [DISCONTINUED] escitalopram (LEXAPRO) tablet 5 mg, 5 mg, Oral, Daily, 5 mg at 09/19/23 0826 **AND** escitalopram (LEXAPRO) tablet 10 mg, 10 mg, Oral, Daily, Nelida Blanchard, CLARISSE, 10 mg at 09/20/23 0842    levoFLOXacin (LEVAQUIN) tablet 750 mg, 750 mg, Oral, Q24H, Ivania Ojeda PA-C, 750 mg at 09/20/23 1058    Magnesium Low Dose Replacement - Follow Nurse / BPA  Driven Protocol, , Does not apply, PRNEdwin Enoch K, MD    melatonin tablet 5 mg, 5 mg, Oral, Nightly PRNEdwin Enoch K, MD, 5 mg at 09/17/23 2036    morphine injection 2 mg, 2 mg, Intravenous, Q4H PRN **AND** naloxone (NARCAN) injection 0.4 mg, 0.4 mg, Intravenous, Q5 Min PRNEdwin Enoch K, MD    nitroglycerin (NITROSTAT) SL tablet 0.4 mg, 0.4 mg, Sublingual, Q5 Min PRNEdwin Enoch K, MD    ondansetron (ZOFRAN) injection 4 mg, 4 mg, Intravenous, Q6H PRNEdwin Enoch K, MD    Phosphorus Replacement - Follow Nurse / BPA Driven Protocol, , Does not apply, Edwin CHANCE Enoch K, MD    Potassium Replacement - Follow Nurse / BPA Driven Protocol, , Does not apply, PRNEdwin Enoch K, MD    sodium chloride 0.9 % flush 10 mL, 10 mL, Intravenous, Q12H, Freddy Pineda MD    sodium chloride 0.9 % flush 10 mL, 10 mL, Intravenous, PRN, Freddy Pineda MD    sodium chloride 0.9 % infusion 40 mL, 40 mL, Intravenous, Edwni CHANCE Enoch K, MD    Diagnoses/Assessment:     Schizophrenia    Suicidal ideations    Severe episode of recurrent major depressive disorder    Lab test positive for detection of COVID-19 virus      Treatment Plan:    Schizophrenia:              --Cont Abilify to 20mg daily     Depression:              --Cont Lexapro 10mg daily              --Cont Abilify 20mg daily for augmentation     Suicidal ideations:              --Discontinue 1:1 to and suicide precautions.     COVID:              --Supportive care.       Nelida Blanchard, APRN  09/20/23 at 16:48 CDT

## 2023-09-21 VITALS
BODY MASS INDEX: 35.9 KG/M2 | WEIGHT: 202.6 LBS | TEMPERATURE: 98.4 F | DIASTOLIC BLOOD PRESSURE: 56 MMHG | SYSTOLIC BLOOD PRESSURE: 105 MMHG | HEART RATE: 103 BPM | RESPIRATION RATE: 18 BRPM | OXYGEN SATURATION: 97 % | HEIGHT: 63 IN

## 2023-09-21 LAB
ANION GAP SERPL CALCULATED.3IONS-SCNC: 10 MMOL/L (ref 5–15)
BASOPHILS # BLD AUTO: 0.03 10*3/MM3 (ref 0–0.2)
BASOPHILS NFR BLD AUTO: 0.7 % (ref 0–1.5)
BUN SERPL-MCNC: 10 MG/DL (ref 6–20)
BUN/CREAT SERPL: 12 (ref 7–25)
CALCIUM SPEC-SCNC: 8.6 MG/DL (ref 8.6–10.5)
CHLORIDE SERPL-SCNC: 104 MMOL/L (ref 98–107)
CO2 SERPL-SCNC: 23 MMOL/L (ref 22–29)
CREAT SERPL-MCNC: 0.83 MG/DL (ref 0.57–1)
DEPRECATED RDW RBC AUTO: 42.6 FL (ref 37–54)
EGFRCR SERPLBLD CKD-EPI 2021: 99.2 ML/MIN/1.73
EOSINOPHIL # BLD AUTO: 0.05 10*3/MM3 (ref 0–0.4)
EOSINOPHIL NFR BLD AUTO: 1.1 % (ref 0.3–6.2)
ERYTHROCYTE [DISTWIDTH] IN BLOOD BY AUTOMATED COUNT: 16.6 % (ref 12.3–15.4)
GLUCOSE SERPL-MCNC: 119 MG/DL (ref 65–99)
HCT VFR BLD AUTO: 31.4 % (ref 34–46.6)
HGB BLD-MCNC: 9.5 G/DL (ref 12–15.9)
IMM GRANULOCYTES # BLD AUTO: 0.01 10*3/MM3 (ref 0–0.05)
IMM GRANULOCYTES NFR BLD AUTO: 0.2 % (ref 0–0.5)
LYMPHOCYTES # BLD AUTO: 1.74 10*3/MM3 (ref 0.7–3.1)
LYMPHOCYTES NFR BLD AUTO: 37.9 % (ref 19.6–45.3)
MAGNESIUM SERPL-MCNC: 2.1 MG/DL (ref 1.6–2.6)
MCH RBC QN AUTO: 21.5 PG (ref 26.6–33)
MCHC RBC AUTO-ENTMCNC: 30.3 G/DL (ref 31.5–35.7)
MCV RBC AUTO: 71 FL (ref 79–97)
MONOCYTES # BLD AUTO: 0.37 10*3/MM3 (ref 0.1–0.9)
MONOCYTES NFR BLD AUTO: 8.1 % (ref 5–12)
NEUTROPHILS NFR BLD AUTO: 2.39 10*3/MM3 (ref 1.7–7)
NEUTROPHILS NFR BLD AUTO: 52 % (ref 42.7–76)
NRBC BLD AUTO-RTO: 0 /100 WBC (ref 0–0.2)
PLATELET # BLD AUTO: 192 10*3/MM3 (ref 140–450)
PMV BLD AUTO: 9.9 FL (ref 6–12)
POTASSIUM SERPL-SCNC: 3.7 MMOL/L (ref 3.5–5.2)
RBC # BLD AUTO: 4.42 10*6/MM3 (ref 3.77–5.28)
SODIUM SERPL-SCNC: 137 MMOL/L (ref 136–145)
WBC NRBC COR # BLD: 4.59 10*3/MM3 (ref 3.4–10.8)

## 2023-09-21 PROCEDURE — 83735 ASSAY OF MAGNESIUM: CPT | Performed by: INTERNAL MEDICINE

## 2023-09-21 PROCEDURE — 85025 COMPLETE CBC W/AUTO DIFF WBC: CPT | Performed by: INTERNAL MEDICINE

## 2023-09-21 PROCEDURE — 80048 BASIC METABOLIC PNL TOTAL CA: CPT | Performed by: INTERNAL MEDICINE

## 2023-09-21 PROCEDURE — 99214 OFFICE O/P EST MOD 30 MIN: CPT

## 2023-09-21 PROCEDURE — G0378 HOSPITAL OBSERVATION PER HR: HCPCS

## 2023-09-21 RX ORDER — ARIPIPRAZOLE 20 MG/1
20 TABLET ORAL DAILY
Qty: 30 TABLET | Refills: 0 | Status: SHIPPED | OUTPATIENT
Start: 2023-09-22 | End: 2023-10-22

## 2023-09-21 RX ORDER — ESCITALOPRAM OXALATE 10 MG/1
10 TABLET ORAL DAILY
Qty: 30 TABLET | Refills: 0 | Status: SHIPPED | OUTPATIENT
Start: 2023-09-22 | End: 2023-10-22

## 2023-09-21 RX ADMIN — LEVOFLOXACIN 750 MG: 750 TABLET, FILM COATED ORAL at 10:51

## 2023-09-21 RX ADMIN — ESCITALOPRAM OXALATE 10 MG: 10 TABLET ORAL at 10:51

## 2023-09-21 RX ADMIN — ARIPIPRAZOLE 20 MG: 10 TABLET ORAL at 10:51

## 2023-09-21 NOTE — DISCHARGE INSTR - APPOINTMENTS
[facility], [facility].  Appointment: Tuesday 09/26/2023 at 11AM  *Telehealth appointment for medication management with Addis Walls.    [facility], [facility].  Appointment: Wednesday 09/27/2023 at 2PM  *In-person appointment for therapy with Ildefonso Koroma.

## 2023-09-21 NOTE — PLAN OF CARE
Goal Outcome Evaluation:                 Vss, stable for discharge, awaiting family to get here to pick pt up

## 2023-09-21 NOTE — PROGRESS NOTES
"Psychiatry Progress Note    9/21/2023    Chief Complaint: suicidal ideation    Subjective:  Patient is a 28 y.o. female who was hospitalized for suicidal ideation.    Patient is seen on Covid isolation due to expressing SI and AH when presenting to the ED.      Patient denies SI and states she is no longer having AVH. Patient is set for discharge home with her mother today.       Objective     Vital Signs    Vitals:    09/20/23 1146 09/20/23 1421 09/20/23 2136 09/21/23 0351   BP: 99/56 106/55 103/54 105/56   BP Location: Left arm Left arm Right arm Left arm   Patient Position: Lying Sitting Lying Lying   Pulse: 66 85 74 103   Resp: 18 18 18 18   Temp: 98.4 °F (36.9 °C) 97.7 °F (36.5 °C) 98 °F (36.7 °C) 98.4 °F (36.9 °C)   TempSrc: Temporal Temporal Temporal Temporal   SpO2: 96% 97% 96% 97%   Weight:    91.9 kg (202 lb 9.6 oz)   Height:           Physical Exam: as of today, 09/21/23   General Appearance: alert, appears stated age, and cooperative,  Hygiene:    adequate  Gait & Station:  deferred, in bed  Musculoskeletal: No tremors or abnormal involuntary movements    Mental Status Exam: as of today, 09/21/23   Cooperation:  Cooperative  Eye Contact:  Good  Psychomotor Behavior:  Appropriate  Mood: \"Good\"  Affect:  mood-congruent  Speech:  Normal  Thought Process:  Coherent  Associations: Goal oriented  Thought Content:                Mood congruent              Suicidal:   Denies              Homicidal:  None              Hallucinations:   Denies              Delusion:  None  Cognitive Functioning:              Consciousness: awake and alert  Reliability:   adequate  Insight:  Fair  Judgement:  Fair  Impulse Control:  Fair    Lab Results: Results source: EMR   Lab Results (last 24 hours)       Procedure Component Value Units Date/Time    Blood Culture - Blood, Arm, Left [[id number]]  (Normal) Collected: 09/17/23 0646    Specimen: Blood from Arm, Left Updated: 09/21/23 0715     Blood Culture No growth at 4 days    " Blood Culture - Blood, Arm, Right [[id number]]  (Normal) Collected: 09/17/23 0646    Specimen: Blood from Arm, Right Updated: 09/21/23 0715     Blood Culture No growth at 4 days    CBC & Differential [[id number]]  (Abnormal) Collected: 09/21/23 0530    Specimen: Blood Updated: 09/21/23 0645    Narrative:      The following orders were created for panel order CBC & Differential.  Procedure                               Abnormality         Status                     ---------                               -----------         ------                     CBC Auto Differential[[id number]]        Abnormal            Final result               Scan Slide[[id number]]                                                                    Please view results for these tests on the individual orders.    CBC Auto Differential [[id number]]  (Abnormal) Collected: 09/21/23 0530    Specimen: Blood Updated: 09/21/23 0645     WBC 4.59 10*3/mm3      RBC 4.42 10*6/mm3      Hemoglobin 9.5 g/dL      Hematocrit 31.4 %      MCV 71.0 fL      MCH 21.5 pg      MCHC 30.3 g/dL      RDW 16.6 %      RDW-SD 42.6 fl      MPV 9.9 fL      Platelets 192 10*3/mm3      Neutrophil % 52.0 %      Lymphocyte % 37.9 %      Monocyte % 8.1 %      Eosinophil % 1.1 %      Basophil % 0.7 %      Immature Grans % 0.2 %      Neutrophils, Absolute 2.39 10*3/mm3      Lymphocytes, Absolute 1.74 10*3/mm3      Monocytes, Absolute 0.37 10*3/mm3      Eosinophils, Absolute 0.05 10*3/mm3      Basophils, Absolute 0.03 10*3/mm3      Immature Grans, Absolute 0.01 10*3/mm3      nRBC 0.0 /100 WBC     Magnesium [[id number]]  (Normal) Collected: 09/21/23 0530    Specimen: Blood Updated: 09/21/23 0637     Magnesium 2.1 mg/dL     Basic Metabolic Panel [[id number]]  (Abnormal) Collected: 09/21/23 0530    Specimen: Blood Updated: 09/21/23 0637     Glucose 119 mg/dL      BUN 10 mg/dL      Creatinine 0.83 mg/dL      Sodium 137 mmol/L      Potassium 3.7 mmol/L      Chloride 104 mmol/L      CO2 23.0  mmol/L      Calcium 8.6 mg/dL      BUN/Creatinine Ratio 12.0     Anion Gap 10.0 mmol/L      eGFR 99.2 mL/min/1.73     Narrative:      GFR Normal >60  Chronic Kidney Disease <60  Kidney Failure <15              Radiology Results:  Imaging Results (Last 24 Hours)       ** No results found for the last 24 hours. **            Medicine:   Current Facility-Administered Medications:     acetaminophen (TYLENOL) tablet 650 mg, 650 mg, Oral, Q4H PRN **OR** acetaminophen (TYLENOL) 160 MG/5ML oral solution 650 mg, 650 mg, Oral, Q4H PRN **OR** acetaminophen (TYLENOL) suppository 650 mg, 650 mg, Rectal, Q4H PRN, Freddy Pineda MD    albuterol (PROVENTIL) nebulizer solution 0.083% 2.5 mg/3mL, 2.5 mg, Nebulization, Q4H PRN, Freddy Pineda MD    ARIPiprazole (ABILIFY) tablet 20 mg, 20 mg, Oral, Daily, Chiquita Gutierrez MD, 20 mg at 09/21/23 1051    sennosides-docusate (PERICOLACE) 8.6-50 MG per tablet 2 tablet, 2 tablet, Oral, BID, 2 tablet at 09/20/23 0842 **AND** polyethylene glycol (MIRALAX) packet 17 g, 17 g, Oral, Daily PRN **AND** bisacodyl (DULCOLAX) EC tablet 5 mg, 5 mg, Oral, Daily PRN **AND** bisacodyl (DULCOLAX) suppository 10 mg, 10 mg, Rectal, Daily PRN, Freddy Pineda MD    Calcium Replacement - Follow Nurse / BPA Driven Protocol, , Does not apply, PRN, Freddy Pineda MD    Enoxaparin Sodium (LOVENOX) syringe 40 mg, 40 mg, Subcutaneous, Daily, Freddy Pineda MD, 40 mg at 09/20/23 0842    [DISCONTINUED] escitalopram (LEXAPRO) tablet 5 mg, 5 mg, Oral, Daily, 5 mg at 09/19/23 0826 **AND** escitalopram (LEXAPRO) tablet 10 mg, 10 mg, Oral, Daily, Santo, Nelida L, APRN, 10 mg at 09/21/23 1051    Magnesium Low Dose Replacement - Follow Nurse / BPA Driven Protocol, , Does not apply, PRN, Freddy Pineda MD    melatonin tablet 5 mg, 5 mg, Oral, Nightly PRN, Freddy Pineda MD, 5 mg at 09/17/23 2036    morphine injection 2 mg, 2 mg, Intravenous, Q4H PRN **AND** naloxone (NARCAN) injection 0.4 mg, 0.4 mg, Intravenous, Q5 Min  PRN, Freddy Pineda MD    nitroglycerin (NITROSTAT) SL tablet 0.4 mg, 0.4 mg, Sublingual, Q5 Min PRNEdwin Enoch K, MD    ondansetron (ZOFRAN) injection 4 mg, 4 mg, Intravenous, Q6H PRNEdwin Enoch K, MD    Phosphorus Replacement - Follow Nurse / BPA Driven Protocol, , Does not apply, PRNEdwin Enoch K, MD    Potassium Replacement - Follow Nurse / BPA Driven Protocol, , Does not apply, PRNEdwin Enoch K, MD    sodium chloride 0.9 % flush 10 mL, 10 mL, Intravenous, Q12H, Freddy Pineda MD    sodium chloride 0.9 % flush 10 mL, 10 mL, Intravenous, PRN, Freddy Pineda MD    sodium chloride 0.9 % infusion 40 mL, 40 mL, Intravenous, PRNEdwin Enoch K, MD    Diagnoses/Assessment:     Schizophrenia    Suicidal ideations    Severe episode of recurrent major depressive disorder    Lab test positive for detection of COVID-19 virus      Treatment Plan:    Schizophrenia:              --Cont Abilify to 20mg daily     Depression:              --Cont Lexapro 10mg daily              --Cont Abilify 20mg daily for augmentation    Patient is to be discharged today.  Patient will return home with mother.     Nelida Blanchard, APRN  09/21/23 at 15:31 CDT

## 2023-09-21 NOTE — DISCHARGE SUMMARY
[facility] Medicine Services  DISCHARGE SUMMARY       Date of Admission: 9/16/2023  Date of Discharge:  9/21/2023  Primary Care Physician: Provider, No Known    Presenting Problem/History of Present Illness:  Auditory hallucination [R44.0]  Suicidal behavior [R45.89]  Suicidal behavior without attempted self-injury [R45.89]  COVID [U07.1]       Final Discharge Diagnoses:  Active Hospital Problems    Diagnosis     **Schizophrenia     Suicidal ideations     Severe episode of recurrent major depressive disorder     Lab test positive for detection of COVID-19 virus        Consults:   Consults       Date and Time Order Name Status Description    9/17/2023  8:45 AM Inpatient Psychiatrist Consult      9/17/2023  6:34 AM Inpatient Psychiatrist Consult Completed             Procedures Performed:                 Pertinent Test Results:   Lab Results (most recent)       Procedure Component Value Units Date/Time    Blood Culture - Blood, Arm, Left [[id number]]  (Normal) Collected: 09/17/23 0646    Specimen: Blood from Arm, Left Updated: 09/21/23 0715     Blood Culture No growth at 4 days    Blood Culture - Blood, Arm, Right [[id number]]  (Normal) Collected: 09/17/23 0646    Specimen: Blood from Arm, Right Updated: 09/21/23 0715     Blood Culture No growth at 4 days    CBC & Differential [[id number]]  (Abnormal) Collected: 09/21/23 0530    Specimen: Blood Updated: 09/21/23 0645    Narrative:      The following orders were created for panel order CBC & Differential.  Procedure                               Abnormality         Status                     ---------                               -----------         ------                     CBC Auto Differential[[id number]]        Abnormal            Final result               Scan Slide[[id number]]                                                                    Please view results for these tests on the individual orders.    CBC Auto  Differential [[id number]]  (Abnormal) Collected: 09/21/23 0530    Specimen: Blood Updated: 09/21/23 0645     WBC 4.59 10*3/mm3      RBC 4.42 10*6/mm3      Hemoglobin 9.5 g/dL      Hematocrit 31.4 %      MCV 71.0 fL      MCH 21.5 pg      MCHC 30.3 g/dL      RDW 16.6 %      RDW-SD 42.6 fl      MPV 9.9 fL      Platelets 192 10*3/mm3      Neutrophil % 52.0 %      Lymphocyte % 37.9 %      Monocyte % 8.1 %      Eosinophil % 1.1 %      Basophil % 0.7 %      Immature Grans % 0.2 %      Neutrophils, Absolute 2.39 10*3/mm3      Lymphocytes, Absolute 1.74 10*3/mm3      Monocytes, Absolute 0.37 10*3/mm3      Eosinophils, Absolute 0.05 10*3/mm3      Basophils, Absolute 0.03 10*3/mm3      Immature Grans, Absolute 0.01 10*3/mm3      nRBC 0.0 /100 WBC     Magnesium [[id number]]  (Normal) Collected: 09/21/23 0530    Specimen: Blood Updated: 09/21/23 0637     Magnesium 2.1 mg/dL     Basic Metabolic Panel [[id number]]  (Abnormal) Collected: 09/21/23 0530    Specimen: Blood Updated: 09/21/23 0637     Glucose 119 mg/dL      BUN 10 mg/dL      Creatinine 0.83 mg/dL      Sodium 137 mmol/L      Potassium 3.7 mmol/L      Chloride 104 mmol/L      CO2 23.0 mmol/L      Calcium 8.6 mg/dL      BUN/Creatinine Ratio 12.0     Anion Gap 10.0 mmol/L      eGFR 99.2 mL/min/1.73     Narrative:      GFR Normal >60  Chronic Kidney Disease <60  Kidney Failure <15      Magnesium [[id number]]  (Normal) Collected: 09/20/23 0642    Specimen: Blood Updated: 09/20/23 0729     Magnesium 2.1 mg/dL     Basic Metabolic Panel [[id number]]  (Abnormal) Collected: 09/20/23 0642    Specimen: Blood Updated: 09/20/23 0729     Glucose 86 mg/dL      BUN 11 mg/dL      Creatinine 0.85 mg/dL      Sodium 139 mmol/L      Potassium 4.1 mmol/L      Chloride 104 mmol/L      CO2 25.0 mmol/L      Calcium 8.4 mg/dL      BUN/Creatinine Ratio 12.9     Anion Gap 10.0 mmol/L      eGFR 96.4 mL/min/1.73     Narrative:      GFR Normal >60  Chronic Kidney Disease <60  Kidney Failure <15      CBC &  Differential [[id number]]  (Abnormal) Collected: 09/20/23 0642    Specimen: Blood Updated: 09/20/23 0718    Narrative:      The following orders were created for panel order CBC & Differential.  Procedure                               Abnormality         Status                     ---------                               -----------         ------                     CBC Auto Differential[[id number]]        Abnormal            Final result               Scan Slide[[id number]]                                                                    Please view results for these tests on the individual orders.    CBC Auto Differential [[id number]]  (Abnormal) Collected: 09/20/23 0642    Specimen: Blood Updated: 09/20/23 0717     WBC 4.34 10*3/mm3      RBC 4.62 10*6/mm3      Hemoglobin 10.0 g/dL      Hematocrit 33.1 %      MCV 71.6 fL      MCH 21.6 pg      MCHC 30.2 g/dL      RDW 16.5 %      RDW-SD 42.4 fl      MPV 10.4 fL      Platelets 195 10*3/mm3      Neutrophil % 53.9 %      Lymphocyte % 36.6 %      Monocyte % 7.4 %      Eosinophil % 1.2 %      Basophil % 0.7 %      Immature Grans % 0.2 %      Neutrophils, Absolute 2.34 10*3/mm3      Lymphocytes, Absolute 1.59 10*3/mm3      Monocytes, Absolute 0.32 10*3/mm3      Eosinophils, Absolute 0.05 10*3/mm3      Basophils, Absolute 0.03 10*3/mm3      Immature Grans, Absolute 0.01 10*3/mm3      nRBC 0.0 /100 WBC     Procalcitonin [[id number]]  (Normal) Collected: 09/17/23 0646    Specimen: Blood Updated: 09/17/23 0845     Procalcitonin 0.06 ng/mL     Narrative:      As a Marker for Sepsis (Non-Neonates):    1. <0.5 ng/mL represents a low risk of severe sepsis and/or septic shock.  2. >2 ng/mL represents a high risk of severe sepsis and/or septic shock.    As a Marker for Lower Respiratory Tract Infections that require antibiotic therapy:    PCT on Admission    Antibiotic Therapy       6-12 Hrs later    >0.5                Strongly Recommended  >0.25 - <0.5        Recommended   0.1 -  "0.25          Discouraged              Remeasure/reassess PCT  <0.1                Strongly Discouraged     Remeasure/reassess PCT    As 28 day mortality risk marker: \"Change in Procalcitonin Result\" (>80% or <=80%) if Day 0 (or Day 1) and Day 4 values are available. Refer to http://www."[employer]pct[facility]cal[facility]    Change in PCT <=80%  A decrease of PCT levels below or equal to 80% defines a positive change in PCT test result representing a higher risk for 28-day all-cause mortality of patients diagnosed with severe sepsis for septic shock.    Change in PCT >80%  A decrease of PCT levels of more than 80% defines a negative change in PCT result representing a lower risk for 28-day all-cause mortality of patients diagnosed with severe sepsis or septic shock.       Lactic Acid, Plasma [[id number]]  (Normal) Collected: 09/17/23 0646    Specimen: Blood Updated: 09/17/23 0733     Lactate 1.0 mmol/L     [place] Draw [[id number]] Collected: 09/16/23 2038    Specimen: Blood Updated: 09/16/23 2146    Narrative:      The following orders were created for panel order [place] Draw.  Procedure                               Abnormality         Status                     ---------                               -----------         ------                     Green Top (Gel)[[id number]]                                  Final result               Lavender Top[[id number]]                                     Final result               Gold Top - SST[[id number]]                                   Final result               Light Blue Top[[id number]]                                   Final result                 Please view results for these tests on the individual orders.    Gold Top - SST [[id number]] Collected: 09/16/23 2038    Specimen: Blood Updated: 09/16/23 2146     Extra Tube Hold for add-ons.     Comment: Auto resulted.       Green Top (Gel) [[id number]] Collected: 09/16/23 2038    Specimen: Blood Updated: 09/16/23 2146     Extra Tube Hold " "for add-ons.     Comment: Auto resulted.       Lavender Top [[id number]] Collected: 09/16/23 2038    Specimen: Blood Updated: 09/16/23 2146     Extra Tube hold for add-on     Comment: Auto resulted       Light Blue Top [[id number]] Collected: 09/16/23 2038    Specimen: Blood Updated: 09/16/23 2146     Extra Tube Hold for add-ons.     Comment: Auto resulted       Urinalysis, Microscopic Only - Urine, Clean Catch [[id number]]  (Abnormal) Collected: 09/16/23 2044    Specimen: Urine, Clean Catch Updated: 09/16/23 2140     RBC, UA 0-2 /HPF      WBC, UA 13-20 /HPF      Bacteria, UA 2+ /HPF      Squamous Epithelial Cells, UA 13-20 /HPF      Comment: \"CLUE\" CELLS        Hyaline Casts, UA       Unable to determine due to loaded field     /LPF     Fine Granular Casts, UA 0-2 /LPF      Methodology Manual Light Microscopy    COVID-19 and FLU A/B PCR - Swab, Nasopharynx [[id number]]  (Abnormal) Collected: 09/16/23 2041    Specimen: Swab from Nasopharynx Updated: 09/16/23 2124     COVID19 Detected     Influenza A PCR Not Detected     Influenza B PCR Not Detected    Narrative:      Fact sheet for providers: https://www.fda.gov/media/851389/download    Fact sheet for patients: https://www.fda.gov/media/788626/download    Test performed by PCR.  Influenza A and Influenza B negative results should be considered presumptive in samples that have a positive SARS-CoV-2 result.    Competitive inhibition studies showed that SARS-CoV-2 virus, when present at concentrations above 3.6E+04 copies/mL, can inhibit the detection and amplification of influenza A and influenza B virus RNA if present at or below 1.8E+02 copies/mL or 4.9E+02 copies/mL, respectively, and may lead to false negative influenza virus results. If co-infection with influenza A or influenza B virus is suspected in samples with a positive SARS-CoV-2 result, the sample should be re-tested with another FDA cleared, approved, or authorized influenza test, if influenza virus " detection would change clinical management.    Fentanyl, Urine - Urine, Clean Catch [[id number]]  (Normal) Collected: 09/16/23 2044    Specimen: Urine, Clean Catch Updated: 09/16/23 2121     Fentanyl, Urine Negative    Narrative:      Negative Threshold:      Fentanyl 5 ng/mL     The normal value for the drug tested is negative. This report includes final unconfirmed screening results to be used for medical treatment purposes only. Unconfirmed results must not be used for non-medical purposes such as employment or legal testing. Clinical consideration should be applied to any drug of abuse test, particularly when unconfirmed results are used.           Comprehensive Metabolic Panel [[id number]]  (Abnormal) Collected: 09/16/23 2038    Specimen: Blood Updated: 09/16/23 2118     Glucose 102 mg/dL      BUN 6 mg/dL      Creatinine 0.94 mg/dL      Sodium 137 mmol/L      Potassium 3.3 mmol/L      Chloride 101 mmol/L      CO2 21.0 mmol/L      Calcium 8.7 mg/dL      Total Protein 7.7 g/dL      Albumin 4.3 g/dL      ALT (SGPT) 25 U/L      AST (SGOT) 48 U/L      Alkaline Phosphatase 70 U/L      Total Bilirubin 0.3 mg/dL      Globulin 3.4 gm/dL      A/G Ratio 1.3 g/dL      BUN/Creatinine Ratio 6.4     Anion Gap 15.0 mmol/L      eGFR 85.5 mL/min/1.73     Narrative:      GFR Normal >60  Chronic Kidney Disease <60  Kidney Failure <15      Acetaminophen Level [[id number]]  (Normal) Collected: 09/16/23 2038    Specimen: Blood Updated: 09/16/23 2118     Acetaminophen <5.0 mcg/mL     Ethanol [[id number]] Collected: 09/16/23 2038    Specimen: Blood Updated: 09/16/23 2118     Ethanol <10 mg/dL      Ethanol % <0.010 %     Salicylate Level [[id number]]  (Normal) Collected: 09/16/23 2038    Specimen: Blood Updated: 09/16/23 2118     Salicylate <0.3 mg/dL     Urine Drug Screen - Urine, Clean Catch [[id number]]  (Normal) Collected: 09/16/23 2044    Specimen: Urine, Clean Catch Updated: 09/16/23 2109     THC, Screen, Urine Negative      Phencyclidine (PCP), Urine Negative     Cocaine Screen, Urine Negative     Methamphetamine, Ur Negative     Opiate Screen Negative     Amphetamine Screen, Urine Negative     Benzodiazepine Screen, Urine Negative     Tricyclic Antidepressants Screen Negative     Methadone Screen, Urine Negative     Barbiturates Screen, Urine Negative     Oxycodone Screen, Urine Negative     Propoxyphene Screen Negative     Buprenorphine, Screen, Urine Negative    Narrative:      Cutoff For Drugs Screened:    Amphetamines               500 ng/ml  Barbiturates               200 ng/ml  Benzodiazepines            150 ng/ml  Cocaine                    150 ng/ml  Methadone                  200 ng/ml  Opiates                    100 ng/ml  Phencyclidine               25 ng/ml  THC                            50 ng/ml  Methamphetamine            500 ng/ml  Tricyclic Antidepressants  300 ng/ml  Oxycodone                  100 ng/ml  Propoxyphene               300 ng/ml  Buprenorphine               10 ng/ml    The normal value for all drugs tested is negative. This report includes unconfirmed screening results, with the cutoff values listed, to be used for medical treatment purposes only.  Unconfirmed results must not be used for non-medical purposes such as employment or legal testing.  Clinical consideration should be applied to any drug of abuse test, particularly when unconfirmed results are used.      Urinalysis With Microscopic If Indicated (No Culture) - Urine, Clean Catch [[id number]]  (Abnormal) Collected: 09/16/23 2044    Specimen: Urine, Clean Catch Updated: 09/16/23 2101     Color, UA Yellow     Appearance, UA Cloudy     pH, UA 7.0     Specific Gravity, UA 1.013     Glucose, UA Negative     Ketones, UA Negative     Bilirubin, UA Negative     Blood, UA Trace     Protein,  mg/dL (2+)     Leuk Esterase, UA Large (3+)     Nitrite, UA Negative     Urobilinogen, UA 1.0 E.U./dL          Imaging Results (Most Recent)       Procedure  "Component Value Units Date/Time    XR Chest PA & Lateral [[id number]] Collected: 09/18/23 1240     Updated: 09/18/23 1244    Narrative:      Comparison:  None    FINDINGS:  Subtle patchy airspace opacities in the bilateral infrahilar lungs may represent  pneumonitis.  No pleural effusion or pneumothorax.  Cardiomediastinal silhouette  is unremarkable.              Chief Complaint on Day of Discharge: none    Hospital Course:  The patient is a 28 y.o. female who presented to [facility] with suicidal ideation and incidental finding COVID 19 test positive. She has been under the care of psychiatrist and no SI and no AVH, were detected by their examination. She is agreeable to be discharge with her mother in [place]    Condition on Discharge:  stable     Physical Exam on Discharge:  /56 (BP Location: Left arm, Patient Position: Lying)   Pulse 103   Temp 98.4 °F (36.9 °C) (Temporal)   Resp 18   Ht 160 cm (63\")   Wt 91.9 kg (202 lb 9.6 oz)   LMP 08/27/2023 (Exact Date)   SpO2 97%   BMI 35.89 kg/m²     Physical Exam  Vitals reviewed.   Constitutional:       Appearance: Normal appearance.   HENT:      Head: Normocephalic.      Nose: Nose normal.      Mouth/Throat:      Mouth: Mucous membranes are moist.   Eyes:      Extraocular Movements: Extraocular movements intact.   Cardiovascular:      Rate and Rhythm: Normal rate.      Heart sounds: Normal heart sounds.   Pulmonary:      Breath sounds: Normal breath sounds.   Abdominal:      General: Bowel sounds are normal.      Palpations: Abdomen is soft.   Musculoskeletal:         General: Normal range of motion.      Cervical back: Normal range of motion.   Skin:     General: Skin is warm.   Neurological:      General: No focal deficit present.      Mental Status: She is alert and oriented to person, place, and time.   Psychiatric:         Behavior: Behavior normal.     Discharge Disposition:  Home or Self Care    Discharge Medications:   "   Discharge Medications        New Medications        Instructions Start Date   ARIPiprazole 20 MG tablet  Commonly known as: ABILIFY   20 mg, Oral, Daily   Start Date: September 22, 2023     escitalopram 10 MG tablet  Commonly known as: LEXAPRO   10 mg, Oral, Daily   Start Date: September 22, 2023            Stop These Medications      nitrofurantoin (macrocrystal-monohydrate) 100 MG capsule  Commonly known as: MACROBID     polyethylene glycol 17 g packet  Commonly known as: MIRALAX               Discharge Diet: regular diet    Activity at Discharge: as tolerated     Discharge Care Plan/Instructions: see chart    Follow-up Appointments:   No future appointments.    Test Results Pending at Discharge:   Pending Labs       Order Current Status    Blood Culture - Blood, Arm, Left Preliminary result    Blood Culture - Blood, Arm, Right Preliminary result            Flaquito Chen MD    Time: 25 min

## 2023-09-21 NOTE — PLAN OF CARE
Goal Outcome Evaluation:  Plan of Care Reviewed With: patient        Progress: improving  Outcome Evaluation: VSS. No acute changes this shift. No complaints voiced. Denies cough and shortness of air. Remains in precautions.

## 2023-09-22 LAB
BACTERIA SPEC AEROBE CULT: NORMAL
BACTERIA SPEC AEROBE CULT: NORMAL

## 2023-09-27 LAB
QT INTERVAL: 378 MS
QT INTERVAL: 394 MS
QTC INTERVAL: 427 MS
QTC INTERVAL: 434 MS

## 2025-04-21 ENCOUNTER — HOSPITAL ENCOUNTER (EMERGENCY)
Facility: HOSPITAL | Age: 30
Discharge: HOME | End: 2025-04-21
Attending: EMERGENCY MEDICINE | Admitting: EMERGENCY MEDICINE
Payer: MEDICAID

## 2025-04-21 ENCOUNTER — APPOINTMENT (OUTPATIENT)
Dept: GENERAL RADIOLOGY | Facility: HOSPITAL | Age: 30
End: 2025-04-21
Payer: MEDICAID

## 2025-04-21 VITALS
DIASTOLIC BLOOD PRESSURE: 86 MMHG | HEART RATE: 95 BPM | WEIGHT: 293 LBS | HEIGHT: 63 IN | OXYGEN SATURATION: 97 % | BODY MASS INDEX: 51.91 KG/M2 | SYSTOLIC BLOOD PRESSURE: 129 MMHG | TEMPERATURE: 98.9 F | RESPIRATION RATE: 14 BRPM
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
## 2025-04-21 DIAGNOSIS — S93.401A MILD ANKLE SPRAIN, RIGHT, INITIAL ENCOUNTER: Primary | ICD-10-CM

## 2025-04-21 PROCEDURE — 99283 EMERGENCY DEPT VISIT LOW MDM: CPT

## 2025-04-21 PROCEDURE — 73610 X-RAY EXAM OF ANKLE: CPT

## 2025-04-21 RX ORDER — IBUPROFEN 600 MG/1
600 TABLET, FILM COATED ORAL EVERY 6 HOURS PRN
Qty: 16 TABLET | Refills: 0 | Status: SHIPPED | OUTPATIENT
Start: 2025-04-21 | End: 2025-04-25

## 2025-04-21 RX ORDER — DULOXETIN HYDROCHLORIDE 60 MG/1
60 CAPSULE, DELAYED RELEASE ORAL DAILY
COMMUNITY

## 2025-04-21 RX ORDER — CETIRIZINE HYDROCHLORIDE 10 MG/1
10 TABLET ORAL DAILY
COMMUNITY

## 2025-04-21 RX ORDER — HYDROXYCHLOROQUINE SULFATE 200 MG/1
TABLET, FILM COATED ORAL DAILY
COMMUNITY

## 2025-04-21 RX ORDER — IBUPROFEN 600 MG/1
600 TABLET, FILM COATED ORAL ONCE
Status: COMPLETED | OUTPATIENT
Start: 2025-04-21 | End: 2025-04-21

## 2025-04-21 RX ADMIN — IBUPROFEN 600 MG: 600 TABLET ORAL at 11:43

## 2025-04-21 NOTE — ED PROVIDER NOTES
EMERGENCY DEPARTMENT ENCOUNTER    Pt Name: Bailee Lora  MRN: [id number]  Pt :   1995  Room Number:  RW1/R1  Date of encounter:  2025  PCP: Kelvin Castañeda APRN  ED Provider: CLARISSE García    Historian: Patient    HPI:  Chief Complaint: Right ankle pain.    Context: Bailee Lora is a 30 y.o. female who presents to the ED c/o right ankle pain.  Patient explains a month ago she sprained her right ankle.  2 days ago she slipped and fell in the shower.  She advises that time she reinjured her right ankle.  She complains of discomfort in both medial and lateral ankle.  She currently has an Ace wrap applied.  She is able to ambulate reports discomfort with doing so.  Patient has not tried ibuprofen or Tylenol.  She denies hitting her head or any loss of consciousness.  She denies any other injuries.  Negative for neck pain, back pain.  HPI     REVIEW OF SYSTEMS  A chief complaint appropriate review of systems was completed and is negative except as noted in the HPI.     PAST MEDICAL HISTORY  Past Medical History:   Diagnosis Date    Lupus     Vaginal delivery        PAST SURGICAL HISTORY  Past Surgical History:   Procedure Laterality Date    DILATATION AND CURETTAGE      INNER EAR SURGERY      MOUTH SURGERY         FAMILY HISTORY  No family history on file.    SOCIAL HISTORY  Social History     Socioeconomic History    Marital status: Single   Tobacco Use    Smoking status: Never    Smokeless tobacco: Never   Vaping Use    Vaping status: Never Used   Substance and Sexual Activity    Alcohol use: No     Comment: no drinks for 84 days    Drug use: Yes     Types: Marijuana     Comment: 84 days since use       ALLERGIES  Amoxicillin, Biaxin [clarithromycin], Erythromycin, and Penicillins    PHYSICAL EXAM  Physical Exam  Vitals and nursing note reviewed.   Constitutional:       General: She is not in acute distress.     Appearance: Normal appearance. She is not ill-appearing  or toxic-appearing.   HENT:      Head: Normocephalic.      Nose: Nose normal.   Eyes:      Extraocular Movements: Extraocular movements intact.      Conjunctiva/sclera: Conjunctivae normal.   Cardiovascular:      Rate and Rhythm: Normal rate and regular rhythm.      Pulses: Normal pulses.   Pulmonary:      Effort: Pulmonary effort is normal.      Breath sounds: Normal breath sounds.   Musculoskeletal:      Cervical back: Normal and normal range of motion.      Thoracic back: Normal.      Lumbar back: Normal.      Right ankle: No swelling or deformity. Tenderness present over the lateral malleolus and medial malleolus. Decreased range of motion. Normal pulse.   Skin:     General: Skin is warm and dry.   Neurological:      General: No focal deficit present.      Mental Status: She is alert and oriented to person, place, and time.   Psychiatric:         Mood and Affect: Mood normal.           LAB RESULTS  Results for orders placed or performed during the hospital encounter of 09/16/23   Comprehensive Metabolic Panel    Collection Time: 09/16/23  9:38 PM    Specimen: Blood   Result Value Ref Range    Glucose 102 (H) 65 - 99 mg/dL    BUN 6 6 - 20 mg/dL    Creatinine 0.94 0.57 - 1.00 mg/dL    Sodium 137 136 - 145 mmol/L    Potassium 3.3 (L) 3.5 - 5.2 mmol/L    Chloride 101 98 - 107 mmol/L    CO2 21.0 (L) 22.0 - 29.0 mmol/L    Calcium 8.7 8.6 - 10.5 mg/dL    Total Protein 7.7 6.0 - 8.5 g/dL    Albumin 4.3 3.5 - 5.2 g/dL    ALT (SGPT) 25 1 - 33 U/L    AST (SGOT) 48 (H) 1 - 32 U/L    Alkaline Phosphatase 70 39 - 117 U/L    Total Bilirubin 0.3 0.0 - 1.2 mg/dL    Globulin 3.4 gm/dL    A/G Ratio 1.3 g/dL    BUN/Creatinine Ratio 6.4 (L) 7.0 - 25.0    Anion Gap 15.0 5.0 - 15.0 mmol/L    eGFR 85.5 >60.0 mL/min/1.73   Acetaminophen Level    Collection Time: 09/16/23  9:38 PM    Specimen: Blood   Result Value Ref Range    Acetaminophen <5.0 0.0 - 30.0 mcg/mL   Ethanol    Collection Time: 09/16/23  9:38 PM    Specimen: Blood   Result  Value Ref Range    Ethanol <10 0 - 10 mg/dL    Ethanol % <0.010 %   Salicylate Level    Collection Time: 09/16/23  9:38 PM    Specimen: Blood   Result Value Ref Range    Salicylate <0.3 <=30.0 mg/dL   CBC Auto Differential    Collection Time: 09/16/23  9:38 PM    Specimen: Blood   Result Value Ref Range    WBC 4.70 3.40 - 10.80 10*3/mm3    RBC 4.77 3.77 - 5.28 10*6/mm3    Hemoglobin 10.5 (L) 12.0 - 15.9 g/dL    Hematocrit 34.0 34.0 - 46.6 %    MCV 71.3 (L) 79.0 - 97.0 fL    MCH 22.0 (L) 26.6 - 33.0 pg    MCHC 30.9 (L) 31.5 - 35.7 g/dL    RDW 16.6 (H) 12.3 - 15.4 %    RDW-SD 42.5 37.0 - 54.0 fl    MPV 10.7 6.0 - 12.0 fL    Platelets 237 140 - 450 10*3/mm3    Neutrophil % 31.9 (L) 42.7 - 76.0 %    Lymphocyte % 57.0 (H) 19.6 - 45.3 %    Monocyte % 8.9 5.0 - 12.0 %    Eosinophil % 1.1 0.3 - 6.2 %    Basophil % 0.9 0.0 - 1.5 %    Immature Grans % 0.2 0.0 - 0.5 %    Neutrophils, Absolute 1.50 (L) 1.70 - 7.00 10*3/mm3    Lymphocytes, Absolute 2.68 0.70 - 3.10 10*3/mm3    Monocytes, Absolute 0.42 0.10 - 0.90 10*3/mm3    Eosinophils, Absolute 0.05 0.00 - 0.40 10*3/mm3    Basophils, Absolute 0.04 0.00 - 0.20 10*3/mm3    Immature Grans, Absolute 0.01 0.00 - 0.05 10*3/mm3    nRBC 0.0 0.0 - 0.2 /100 WBC   Green Top (Gel)    Collection Time: 09/16/23  9:38 PM   Result Value Ref Range    Extra Tube Hold for add-ons.    Lavender Top    Collection Time: 09/16/23  9:38 PM   Result Value Ref Range    Extra Tube hold for add-on    Gold Top - SST    Collection Time: 09/16/23  9:38 PM   Result Value Ref Range    Extra Tube Hold for add-ons.    Light Blue Top    Collection Time: 09/16/23  9:38 PM   Result Value Ref Range    Extra Tube Hold for add-ons.    COVID-19 and FLU A/B PCR - Swab, Nasopharynx    Collection Time: 09/16/23  9:41 PM    Specimen: Nasopharynx; Swab   Result Value Ref Range    COVID19 Detected (C) Not Detected - Ref. Range    Influenza A PCR Not Detected Not Detected    Influenza B PCR Not Detected Not Detected   Urine  Drug Screen - Urine, Clean Catch    Collection Time: 09/16/23  9:44 PM    Specimen: Urine, Clean Catch   Result Value Ref Range    THC, Screen, Urine Negative Negative    Phencyclidine (PCP), Urine Negative Negative    Cocaine Screen, Urine Negative Negative    Methamphetamine, Ur Negative Negative    Opiate Screen Negative Negative    Amphetamine Screen, Urine Negative Negative    Benzodiazepine Screen, Urine Negative Negative    Tricyclic Antidepressants Screen Negative Negative    Methadone Screen, Urine Negative Negative    Barbiturates Screen, Urine Negative Negative    Oxycodone Screen, Urine Negative Negative    Propoxyphene Screen Negative Negative    Buprenorphine, Screen, Urine Negative Negative   Urinalysis With Microscopic If Indicated (No Culture) - Urine, Clean Catch    Collection Time: 09/16/23  9:44 PM    Specimen: Urine, Clean Catch   Result Value Ref Range    Color, UA Yellow Yellow, Straw, Dark Yellow, Chika    Appearance, UA Cloudy (A) Clear    pH, UA 7.0 5.0 - 9.0    Specific Gravity, UA 1.013 1.003 - 1.030    Glucose, UA Negative Negative    Ketones, UA Negative Negative    Bilirubin, UA Negative Negative    Blood, UA Trace (A) Negative    Protein,  mg/dL (2+) (A) Negative    Leuk Esterase, UA Large (3+) (A) Negative    Nitrite, UA Negative Negative    Urobilinogen, UA 1.0 E.U./dL 0.2 - 1.0 E.U./dL   Fentanyl, Urine - Urine, Clean Catch    Collection Time: 09/16/23  9:44 PM    Specimen: Urine, Clean Catch   Result Value Ref Range    Fentanyl, Urine Negative Negative   Urinalysis, Microscopic Only - Urine, Clean Catch    Collection Time: 09/16/23  9:44 PM    Specimen: Urine, Clean Catch   Result Value Ref Range    RBC, UA 0-2 (A) None Seen /HPF    WBC, UA 13-20 (A) None Seen, 0-2, 3-5 /HPF    Bacteria, UA 2+ (A) None Seen /HPF    Squamous Epithelial Cells, UA 13-20 (A) None Seen, 0-2 /HPF    Hyaline Casts, UA Unable to determine due to loaded field None Seen /LPF    Fine Granular Casts, UA  0-2 None Seen /LPF    Methodology Manual Light Microscopy    Blood Culture - Blood, Arm, Left    Collection Time: 09/17/23  7:46 AM    Specimen: Arm, Left; Blood   Result Value Ref Range    Blood Culture No growth at 5 days    Blood Culture - Blood, Arm, Right    Collection Time: 09/17/23  7:46 AM    Specimen: Arm, Right; Blood   Result Value Ref Range    Blood Culture No growth at 5 days    Lactic Acid, Plasma    Collection Time: 09/17/23  7:46 AM    Specimen: Blood   Result Value Ref Range    Lactate 1.0 0.5 - 2.0 mmol/L   Procalcitonin    Collection Time: 09/17/23  7:46 AM    Specimen: Blood   Result Value Ref Range    Procalcitonin 0.06 0.00 - 0.25 ng/mL   ECG 12 Lead Drug Monitoring; Schizophrenic starting Abilify and patient may require as needed Haldol    Collection Time: 09/17/23  7:47 AM   Result Value Ref Range    QT Interval 378 ms    QTC Interval 427 ms   ECG 12 Lead Chest Pain    Collection Time: 09/17/23  3:49 PM   Result Value Ref Range    QT Interval 394 ms    QTC Interval 434 ms   Basic Metabolic Panel    Collection Time: 09/18/23  8:00 AM    Specimen: Blood   Result Value Ref Range    Glucose 101 (H) 65 - 99 mg/dL    BUN 7 6 - 20 mg/dL    Creatinine 0.85 0.57 - 1.00 mg/dL    Sodium 138 136 - 145 mmol/L    Potassium 4.0 3.5 - 5.2 mmol/L    Chloride 104 98 - 107 mmol/L    CO2 23.0 22.0 - 29.0 mmol/L    Calcium 8.6 8.6 - 10.5 mg/dL    BUN/Creatinine Ratio 8.2 7.0 - 25.0    Anion Gap 11.0 5.0 - 15.0 mmol/L    eGFR 96.4 >60.0 mL/min/1.73   Magnesium    Collection Time: 09/18/23  8:00 AM    Specimen: Blood   Result Value Ref Range    Magnesium 1.9 1.6 - 2.6 mg/dL   CBC Auto Differential    Collection Time: 09/18/23  8:00 AM    Specimen: Blood   Result Value Ref Range    WBC 4.46 3.40 - 10.80 10*3/mm3    RBC 4.84 3.77 - 5.28 10*6/mm3    Hemoglobin 10.5 (L) 12.0 - 15.9 g/dL    Hematocrit 34.2 34.0 - 46.6 %    MCV 70.7 (L) 79.0 - 97.0 fL    MCH 21.7 (L) 26.6 - 33.0 pg    MCHC 30.7 (L) 31.5 - 35.7 g/dL     RDW 16.6 (H) 12.3 - 15.4 %    RDW-SD 42.2 37.0 - 54.0 fl    MPV 10.4 6.0 - 12.0 fL    Platelets 196 140 - 450 10*3/mm3    Neutrophil % 52.3 42.7 - 76.0 %    Lymphocyte % 39.5 19.6 - 45.3 %    Monocyte % 6.7 5.0 - 12.0 %    Eosinophil % 0.9 0.3 - 6.2 %    Basophil % 0.4 0.0 - 1.5 %    Immature Grans % 0.2 0.0 - 0.5 %    Neutrophils, Absolute 2.33 1.70 - 7.00 10*3/mm3    Lymphocytes, Absolute 1.76 0.70 - 3.10 10*3/mm3    Monocytes, Absolute 0.30 0.10 - 0.90 10*3/mm3    Eosinophils, Absolute 0.04 0.00 - 0.40 10*3/mm3    Basophils, Absolute 0.02 0.00 - 0.20 10*3/mm3    Immature Grans, Absolute 0.01 0.00 - 0.05 10*3/mm3    nRBC 0.0 0.0 - 0.2 /100 WBC   Basic Metabolic Panel    Collection Time: 09/19/23  7:14 AM    Specimen: Blood   Result Value Ref Range    Glucose 82 65 - 99 mg/dL    BUN 12 6 - 20 mg/dL    Creatinine 0.85 0.57 - 1.00 mg/dL    Sodium 135 (L) 136 - 145 mmol/L    Potassium 4.0 3.5 - 5.2 mmol/L    Chloride 101 98 - 107 mmol/L    CO2 23.0 22.0 - 29.0 mmol/L    Calcium 8.7 8.6 - 10.5 mg/dL    BUN/Creatinine Ratio 14.1 7.0 - 25.0    Anion Gap 11.0 5.0 - 15.0 mmol/L    eGFR 96.4 >60.0 mL/min/1.73   Magnesium    Collection Time: 09/19/23  7:14 AM    Specimen: Blood   Result Value Ref Range    Magnesium 2.1 1.6 - 2.6 mg/dL   CBC Auto Differential    Collection Time: 09/19/23  7:14 AM    Specimen: Blood   Result Value Ref Range    WBC 4.51 3.40 - 10.80 10*3/mm3    RBC 4.69 3.77 - 5.28 10*6/mm3    Hemoglobin 10.2 (L) 12.0 - 15.9 g/dL    Hematocrit 33.2 (L) 34.0 - 46.6 %    MCV 70.8 (L) 79.0 - 97.0 fL    MCH 21.7 (L) 26.6 - 33.0 pg    MCHC 30.7 (L) 31.5 - 35.7 g/dL    RDW 16.5 (H) 12.3 - 15.4 %    RDW-SD 41.6 37.0 - 54.0 fl    MPV 10.4 6.0 - 12.0 fL    Platelets 187 140 - 450 10*3/mm3    Neutrophil % 50.3 42.7 - 76.0 %    Lymphocyte % 39.5 19.6 - 45.3 %    Monocyte % 8.0 5.0 - 12.0 %    Eosinophil % 1.1 0.3 - 6.2 %    Basophil % 0.9 0.0 - 1.5 %    Immature Grans % 0.2 0.0 - 0.5 %    Neutrophils, Absolute 2.27 1.70 -  7.00 10*3/mm3    Lymphocytes, Absolute 1.78 0.70 - 3.10 10*3/mm3    Monocytes, Absolute 0.36 0.10 - 0.90 10*3/mm3    Eosinophils, Absolute 0.05 0.00 - 0.40 10*3/mm3    Basophils, Absolute 0.04 0.00 - 0.20 10*3/mm3    Immature Grans, Absolute 0.01 0.00 - 0.05 10*3/mm3    nRBC 0.0 0.0 - 0.2 /100 WBC   Basic Metabolic Panel    Collection Time: 09/20/23  7:42 AM    Specimen: Blood   Result Value Ref Range    Glucose 86 65 - 99 mg/dL    BUN 11 6 - 20 mg/dL    Creatinine 0.85 0.57 - 1.00 mg/dL    Sodium 139 136 - 145 mmol/L    Potassium 4.1 3.5 - 5.2 mmol/L    Chloride 104 98 - 107 mmol/L    CO2 25.0 22.0 - 29.0 mmol/L    Calcium 8.4 (L) 8.6 - 10.5 mg/dL    BUN/Creatinine Ratio 12.9 7.0 - 25.0    Anion Gap 10.0 5.0 - 15.0 mmol/L    eGFR 96.4 >60.0 mL/min/1.73   Magnesium    Collection Time: 09/20/23  7:42 AM    Specimen: Blood   Result Value Ref Range    Magnesium 2.1 1.6 - 2.6 mg/dL   CBC Auto Differential    Collection Time: 09/20/23  7:42 AM    Specimen: Blood   Result Value Ref Range    WBC 4.34 3.40 - 10.80 10*3/mm3    RBC 4.62 3.77 - 5.28 10*6/mm3    Hemoglobin 10.0 (L) 12.0 - 15.9 g/dL    Hematocrit 33.1 (L) 34.0 - 46.6 %    MCV 71.6 (L) 79.0 - 97.0 fL    MCH 21.6 (L) 26.6 - 33.0 pg    MCHC 30.2 (L) 31.5 - 35.7 g/dL    RDW 16.5 (H) 12.3 - 15.4 %    RDW-SD 42.4 37.0 - 54.0 fl    MPV 10.4 6.0 - 12.0 fL    Platelets 195 140 - 450 10*3/mm3    Neutrophil % 53.9 42.7 - 76.0 %    Lymphocyte % 36.6 19.6 - 45.3 %    Monocyte % 7.4 5.0 - 12.0 %    Eosinophil % 1.2 0.3 - 6.2 %    Basophil % 0.7 0.0 - 1.5 %    Immature Grans % 0.2 0.0 - 0.5 %    Neutrophils, Absolute 2.34 1.70 - 7.00 10*3/mm3    Lymphocytes, Absolute 1.59 0.70 - 3.10 10*3/mm3    Monocytes, Absolute 0.32 0.10 - 0.90 10*3/mm3    Eosinophils, Absolute 0.05 0.00 - 0.40 10*3/mm3    Basophils, Absolute 0.03 0.00 - 0.20 10*3/mm3    Immature Grans, Absolute 0.01 0.00 - 0.05 10*3/mm3    nRBC 0.0 0.0 - 0.2 /100 WBC   Basic Metabolic Panel    Collection Time: 09/21/23   6:30 AM    Specimen: Blood   Result Value Ref Range    Glucose 119 (H) 65 - 99 mg/dL    BUN 10 6 - 20 mg/dL    Creatinine 0.83 0.57 - 1.00 mg/dL    Sodium 137 136 - 145 mmol/L    Potassium 3.7 3.5 - 5.2 mmol/L    Chloride 104 98 - 107 mmol/L    CO2 23.0 22.0 - 29.0 mmol/L    Calcium 8.6 8.6 - 10.5 mg/dL    BUN/Creatinine Ratio 12.0 7.0 - 25.0    Anion Gap 10.0 5.0 - 15.0 mmol/L    eGFR 99.2 >60.0 mL/min/1.73   Magnesium    Collection Time: 09/21/23  6:30 AM    Specimen: Blood   Result Value Ref Range    Magnesium 2.1 1.6 - 2.6 mg/dL   CBC Auto Differential    Collection Time: 09/21/23  6:30 AM    Specimen: Blood   Result Value Ref Range    WBC 4.59 3.40 - 10.80 10*3/mm3    RBC 4.42 3.77 - 5.28 10*6/mm3    Hemoglobin 9.5 (L) 12.0 - 15.9 g/dL    Hematocrit 31.4 (L) 34.0 - 46.6 %    MCV 71.0 (L) 79.0 - 97.0 fL    MCH 21.5 (L) 26.6 - 33.0 pg    MCHC 30.3 (L) 31.5 - 35.7 g/dL    RDW 16.6 (H) 12.3 - 15.4 %    RDW-SD 42.6 37.0 - 54.0 fl    MPV 9.9 6.0 - 12.0 fL    Platelets 192 140 - 450 10*3/mm3    Neutrophil % 52.0 42.7 - 76.0 %    Lymphocyte % 37.9 19.6 - 45.3 %    Monocyte % 8.1 5.0 - 12.0 %    Eosinophil % 1.1 0.3 - 6.2 %    Basophil % 0.7 0.0 - 1.5 %    Immature Grans % 0.2 0.0 - 0.5 %    Neutrophils, Absolute 2.39 1.70 - 7.00 10*3/mm3    Lymphocytes, Absolute 1.74 0.70 - 3.10 10*3/mm3    Monocytes, Absolute 0.37 0.10 - 0.90 10*3/mm3    Eosinophils, Absolute 0.05 0.00 - 0.40 10*3/mm3    Basophils, Absolute 0.03 0.00 - 0.20 10*3/mm3    Immature Grans, Absolute 0.01 0.00 - 0.05 10*3/mm3    nRBC 0.0 0.0 - 0.2 /100 WBC       If labs were ordered, I independently reviewed the results and considered them in treating the patient.    RADIOLOGY  XR Ankle 3+ View Right   Final Result   Impression:   Normal ankle.            Electronically Signed: Walker Yi MD     4/21/2025 11:11 AM EDT     Workstation ID: [id number]        [] Radiologist's Report Reviewed:  I ordered and independently interpreted the above noted radiographic  studies.  See radiologist's dictation for official interpretation.      PROCEDURES    Procedures    No orders to display       MEDICATIONS GIVEN IN ER    Medications   ibuprofen (ADVIL,MOTRIN) tablet 600 mg (has no administration in time range)       MEDICAL DECISION MAKING, PROGRESS, and CONSULTS   Medical Decision Making  Bailee Lora is a 30 y.o. female who presents to the ED c/o right ankle pain.  Patient explains a month ago she sprained her right ankle.  2 days ago she slipped and fell in the shower.  She advises that time she reinjured her right ankle.  She complains of discomfort in both medial and lateral ankle.  She currently has an Ace wrap applied.  She is able to ambulate reports discomfort with doing so.  Patient has not tried ibuprofen or Tylenol.  She denies hitting her head or any loss of consciousness.  She denies any other injuries.  Negative for neck pain, back pain.      Amount and/or Complexity of Data Reviewed  Radiology: ordered. Decision-making details documented in ED Course.    Risk  Prescription drug management.        Discussion below represents my analysis of pertinent findings related to patient's condition, differential diagnosis, treatment plan and final disposition.    Differential diagnosis: sprain, strain, fracture, dislocation  Additional differential diagnosis include but are not limited to:     Additional sources  Discussed/ obtained information from independent historians:   [] Spouse  [] Parent  [] Family member  [] Friend  [] EMS   [] Other:  External (non-ED) record review:   [] Inpatient record:   [] Office record:   [] Outpatient record:   [x] Prior Outpatient labs:   [x] Prior Outpatient radiology:   [] Primary Care record:   [] Outside ED record:   [] Other:   Patient's care impacted by:   [] Diabetes  [] Hypertension  [] Hyperlipidemia  [] Hypothyroidism   [] Coronary Artery Disease  [] Congestive Heart Failure   [] COPD   [] Cancer   [] Obesity  []  GERD   [] Tobacco Abuse   [] Substance Abuse    [] Anxiety   [] Depression   [x] Other: h/o Lupus  Care significantly affected by Social Determinants of Health (housing and economic circumstances, unemployment)    [] Yes     [x] No   If yes, Patient's care significantly limited by  Social Determinants of Health including:   [] Inadequate housing   [] Low income   [] Alcoholism and drug addiction in family   [] Problems related to primary support group   [] Unemployment   [] Problems related to employment   [] Other Social Determinants of Health:     Orders placed during this visit:  Orders Placed This Encounter   Procedures    XR Ankle 3+ View Right    Apply ace wrap       I considered prescription management  with:   [] Pain medication  [] Antiviral  [] Antibiotic   [] Other:   Rationale:  Additional orders considered but not ordered:  The following testing was considered but ultimately not selected after discussion with patient/family:  ED Course:    ED Course as of 04/21/25 1141   Mon Apr 21, 2025   1116 Right ankle x-ray reviewed negative for acute fracture, dislocation. [KG]   1137 Shared decision making with patient patient is aware that she does not have a fracture or dislocation.  Patient aware that the x-ray read is normal.  We will treat again for sprain.  We discussed rest, ice, compression and elevate.  We will reapply an Ace wrap.  I offered patient crutches and she declines.  Will refer patient to PCP and Ortho. [KG]      ED Course User Index  [KG] Astrid Johnston, APRN            DIAGNOSIS  Final diagnoses:   Mild ankle sprain, right, initial encounter   Fall in bathtub, initial encounter       DISPOSITION    DISCHARGE    Patient discharged in stable condition.    Reviewed implications of results, diagnosis, meds, responsibility to follow up, warning signs and symptoms of possible worsening, potential complications and reasons to return to ER.    Patient/Family voiced understanding of above  instructions.    Discussed plan for discharge, as there is no emergent indication for admission.  Pt/family is agreeable and understands need for follow up and possible repeat testing.  Pt/family is aware that discharge does not mean that nothing is wrong but that it indicates no emergency is currently present that requires admission and they must continue care with follow-up as given below or with a physician of their choice.     FOLLOW-UP  Kelvin Castañeda, APRN  [address] [facility]  [address]  [place] [id number] [phone]          Js Torres Jr., MD  [address] [facility] [address]  [place] [address]  [phone]               Medication List        New Prescriptions      ibuprofen 600 MG tablet  Commonly known as: ADVIL,MOTRIN  Take 1 tablet by mouth Every 6 (Six) Hours As Needed for Mild Pain for up to 4 days.               Where to Get Your Medications        These medications were sent to [facility] - [place], [place] - [address] [facility] [id number] - [phone]  - [phone]   [address] [place] [address] [address] [facility] [address]      Phone: [phone]   ibuprofen 600 MG tablet          ED Disposition       ED Disposition   Discharge    Condition   Stable    Comment   --                   Astrid Johnston, APRN  04/21/25 1141

## 2025-05-27 ENCOUNTER — OFFICE VISIT (OUTPATIENT)
Dept: OBSTETRICS AND GYNECOLOGY | Facility: CLINIC | Age: 30
End: 2025-05-27
Payer: MEDICAID

## 2025-05-27 ENCOUNTER — PATIENT ROUNDING (BHMG ONLY) (OUTPATIENT)
Dept: OBSTETRICS AND GYNECOLOGY | Facility: CLINIC | Age: 30
End: 2025-05-27
Payer: MEDICAID

## 2025-05-27 VITALS
WEIGHT: 293 LBS | BODY MASS INDEX: 51.91 KG/M2 | DIASTOLIC BLOOD PRESSURE: 84 MMHG | HEIGHT: 63 IN | SYSTOLIC BLOOD PRESSURE: 126 MMHG

## 2025-05-27 DIAGNOSIS — N92.0 MENORRHAGIA WITH REGULAR CYCLE: Primary | ICD-10-CM

## 2025-05-27 DIAGNOSIS — Z01.419 ENCOUNTER FOR ANNUAL ROUTINE GYNECOLOGICAL EXAMINATION: ICD-10-CM

## 2025-05-27 NOTE — PROGRESS NOTES
Gynecologic Annual Exam Note        Establish Care, Gynecologic Exam, Annual Exam, and Menorrhagia        Subjective     HPI  Bailee Lora is a 30 y.o.  female who presents for annual well woman exam as a new patient. . Patient's last menstrual period was 2025. Her periods occur every 28-30 days, lasting 1 week.  The flow is heavy. She reports dysmenorrhea is severe occurring first 1-2 days of flow. Marital Status: single.  She is sexually active. She has not had any recent new partners.. STD testing recommendations have been explained to the patient and she does desire STD testing.    The patient would like to discuss the following complaints today: heavy menses that has been present since she was 16 y/o. Patient states she previously had D&C at age 18 y/o for heavy menses but that did not help, she has tried OCP's, nexplanon, IUD's, depo injection with no improvement and it made her s/s worse. Patient would like to discuss possibly getting a hysterectomy.     Additional OB/GYN History   contraceptive methods: None  Desires to: do not start contraception  Thromboembolic Disease: none  History of migraines: no  Age of menarche: 16    History of STD: yes  trich in     Last Pap : beginning of . Results: negative. HPV:  not done . (Performed at [facility] with Dr. Chambers)   Last Completed Pap Smear    This patient has no relevant Health Maintenance data.          History of abnormal Pap smear: no  Gardasil status: did not have  Family history of uterine, colon, breast, or ovarian cancer: yes - Maternal cousin had breast cancer  Performs monthly Self-Breast Exam: no  Exercises Regularly:no  Feelings of Anxiety or Depression: yes - both  Tobacco Usage?: No       Current Outpatient Medications:     ARIPiprazole ER (ABILIFY MAINTENA) 400 MG prefilled syringe IM prefilled syringe, Takes 600mg injection monthly, Disp: , Rfl:     Cariprazine HCl (Vraylar) 1.5 MG capsule capsule, Take 1  capsule by mouth Daily., Disp: , Rfl:     cetirizine (zyrTEC) 10 MG tablet, Take 1 tablet by mouth Daily., Disp: , Rfl:     DULoxetine (CYMBALTA) 60 MG capsule, Take 1 capsule by mouth Daily., Disp: , Rfl:     hydroxychloroquine (PLAQUENIL) 200 MG tablet, Take  by mouth Daily., Disp: , Rfl:     escitalopram (LEXAPRO) 10 MG tablet, Take 1 tablet by mouth Daily for 30 days. (Patient not taking: Reported on 2025), Disp: 30 tablet, Rfl: 0     Patient denies the need for medication refills today.    OB History          1    Para   1    Term   1            AB        Living   1         SAB        IAB        Ectopic        Molar        Multiple        Live Births                    Health Maintenance   Topic Date Due    Annual Gynecologic Pelvic and Breast Exam  Never done    Pneumococcal Vaccine 0-49 (1 of 2 - PCV) Never done    PAP SMEAR  Never done    COVID-19 Vaccine ( - - season) 2024    ANNUAL PHYSICAL  Never done    INFLUENZA VACCINE  2025    TDAP/TD VACCINES (2 - Td or Tdap) 10/19/2028    HEPATITIS C SCREENING  Completed    CHLAMYDIA SCREENING  Discontinued       Past Medical History:   Diagnosis Date    Anemia     Anxiety     Asthma     Bipolar disorder     Clotting disorder     Endometriosis     Lupus     Rh incompatibility     2019    Urinary tract infection     Urogenital trichomoniasis     Vaginal delivery         Past Surgical History:   Procedure Laterality Date    DILATATION AND CURETTAGE      INNER EAR SURGERY      MOUTH SURGERY      TONSILLECTOMY      WISDOM TOOTH EXTRACTION         The additional following portions of the patient's history were reviewed and updated as appropriate: allergies, current medications, past family history, past medical history, past social history, past surgical history, and problem list.    Review of Systems   Constitutional: Negative.    Respiratory: Negative.     Cardiovascular: Negative.    Gastrointestinal: Negative.   "  Genitourinary:  Positive for menstrual problem. Negative for breast discharge, breast lump, breast pain and pelvic pain.   Musculoskeletal: Negative.    Neurological: Negative.    Psychiatric/Behavioral: Negative.           I have reviewed and agree with the HPI, ROS, and historical information as entered above. Yfn Schwartz MD          Objective   /84   Ht 160 cm (62.99\")   Wt (!) 148 kg (325 lb 12.8 oz)   LMP 05/18/2025   Breastfeeding No   BMI 57.73 kg/m²     Physical Exam  Vitals reviewed. Exam conducted with a chaperone present.   Constitutional:       Appearance: Normal appearance. She is obese.   HENT:      Head: Normocephalic and atraumatic.   Cardiovascular:      Rate and Rhythm: Normal rate and regular rhythm.   Pulmonary:      Effort: Pulmonary effort is normal.      Breath sounds: Normal breath sounds.   Chest:   Breasts:     Right: Normal.      Left: Normal.   Abdominal:      General: Abdomen is flat.      Palpations: Abdomen is soft.   Genitourinary:     General: Normal vulva.      Vagina: Normal.      Cervix: Normal.      Uterus: Normal.       Adnexa: Right adnexa normal and left adnexa normal.   Musculoskeletal:      Cervical back: Neck supple.   Skin:     General: Skin is warm and dry.   Neurological:      Mental Status: She is alert and oriented to person, place, and time.   Psychiatric:         Mood and Affect: Mood normal.         Behavior: Behavior normal.            Assessment and Plan    Problem List Items Addressed This Visit    None  Visit Diagnoses         Menorrhagia with regular cycle    -  Primary    Relevant Orders    CBC (No Diff)    Hemoglobin A1c    TSH    Comprehensive Metabolic Panel    US Non-ob Transvaginal      Encounter for annual routine gynecological examination                GYN annual well woman exam.   Reviewed pap guidelines.   Encouraged use of condoms for STD prevention.  Reviewed monthly self breast exams.  Instructed to call with lumps, pain, or " breast discharge.    Reviewed BMI and weight loss as preventative health measures.   Reviewed exercise as a preventative health measures.   Reccommended Flu Vaccine in Fall of each year.  Will draw labs and f/u with US for further evaluation of menorrhgia. Will discuss long term treatment at f/u visit  Return in about 2 weeks (around 6/10/2025) for GYN visit and US.    Yfn Schwartz MD  05/27/2025

## 2025-05-27 NOTE — PROGRESS NOTES
May 27, 2025    Hello, may I speak with Bailee Lora?    My name is CRISTIAN      I am  with [facility]   [facility]  [address]  [facility] [address] [phone].    Before we get started may I verify your date of birth? 1995    I am calling to officially welcome you to our practice and ask about your recent visit. Is this a good time to talk? yes    Tell me about your visit with us. What things went well?  EVERYTHING WAS PERFECT NO COMPLAINTS       We're always looking for ways to make our patients' experiences even better. Do you have recommendations on ways we may improve?  no    Overall were you satisfied with your first visit to our practice? yes       I appreciate you taking the time to speak with me today. Is there anything else I can do for you? no      Thank you, and have a great day.

## 2025-05-28 LAB
ALBUMIN SERPL-MCNC: 4.1 G/DL (ref 3.5–5.2)
ALBUMIN/GLOB SERPL: 1.2 G/DL
ALP SERPL-CCNC: 86 U/L (ref 39–117)
ALT SERPL-CCNC: 19 U/L (ref 1–33)
AST SERPL-CCNC: 26 U/L (ref 1–32)
BILIRUB SERPL-MCNC: <0.2 MG/DL (ref 0–1.2)
BUN SERPL-MCNC: 7 MG/DL (ref 6–20)
BUN/CREAT SERPL: 7.6 (ref 7–25)
CALCIUM SERPL-MCNC: 9.6 MG/DL (ref 8.6–10.5)
CHLORIDE SERPL-SCNC: 104 MMOL/L (ref 98–107)
CO2 SERPL-SCNC: 29.6 MMOL/L (ref 22–29)
CREAT SERPL-MCNC: 0.92 MG/DL (ref 0.57–1)
EGFRCR SERPLBLD CKD-EPI 2021: 86.6 ML/MIN/1.73
ERYTHROCYTE [DISTWIDTH] IN BLOOD BY AUTOMATED COUNT: 16.9 % (ref 12.3–15.4)
GLOBULIN SER CALC-MCNC: 3.3 GM/DL
GLUCOSE SERPL-MCNC: 81 MG/DL (ref 65–99)
HBA1C MFR BLD: 5.1 % (ref 4.8–5.6)
HCT VFR BLD AUTO: 37.9 % (ref 34–46.6)
HGB BLD-MCNC: 11.7 G/DL (ref 12–15.9)
MCH RBC QN AUTO: 23.4 PG (ref 26.6–33)
MCHC RBC AUTO-ENTMCNC: 30.9 G/DL (ref 31.5–35.7)
MCV RBC AUTO: 76 FL (ref 79–97)
PLATELET # BLD AUTO: 261 10*3/MM3 (ref 140–450)
POTASSIUM SERPL-SCNC: 4.8 MMOL/L (ref 3.5–5.2)
PROT SERPL-MCNC: 7.4 G/DL (ref 6–8.5)
RBC # BLD AUTO: 4.99 10*6/MM3 (ref 3.77–5.28)
SODIUM SERPL-SCNC: 143 MMOL/L (ref 136–145)
TSH SERPL DL<=0.005 MIU/L-ACNC: 1.3 UIU/ML (ref 0.27–4.2)
WBC # BLD AUTO: 6.08 10*3/MM3 (ref 3.4–10.8)

## 2025-05-30 LAB — REF LAB TEST METHOD: NORMAL

## 2025-06-17 ENCOUNTER — OFFICE VISIT (OUTPATIENT)
Dept: OBSTETRICS AND GYNECOLOGY | Facility: CLINIC | Age: 30
End: 2025-06-17
Payer: MEDICAID

## 2025-06-17 VITALS — DIASTOLIC BLOOD PRESSURE: 82 MMHG | SYSTOLIC BLOOD PRESSURE: 116 MMHG | BODY MASS INDEX: 59.36 KG/M2 | WEIGHT: 293 LBS

## 2025-06-17 DIAGNOSIS — Z31.83 ENCOUNTER FOR ASSISTED REPRODUCTIVE FERTILITY CYCLE: ICD-10-CM

## 2025-06-17 DIAGNOSIS — R87.610 ATYPICAL SQUAMOUS CELLS OF UNDETERMINED SIGNIFICANCE ON CYTOLOGIC SMEAR OF CERVIX (ASC-US): Primary | ICD-10-CM

## 2025-06-17 LAB
B-HCG UR QL: NEGATIVE
EXPIRATION DATE: NORMAL
INTERNAL NEGATIVE CONTROL: NORMAL
INTERNAL POSITIVE CONTROL: NORMAL
Lab: NORMAL

## 2025-06-17 NOTE — PROGRESS NOTES
Colposcopy Procedure Note        Procedures    Indications: Bailee Lora is a 30 y.o. female, , whose Patient's last menstrual period was 2025 (exact date)..  She presents for follow up for evaluation of an abnormal PAP smear that showed ASCUS HPV Pool +. Prior cervical treatment includes: no treatment. She understands the need for the procedure and is aware of the complications, including post-colposcopic vaginal bleeding, vaginal leukorrhea or cervicitis.  She is aware she may experience discomfort.  After being presented with the risk, benefits, and alternatives the patient wished to proceed.      Urine pregnancy test done in the office today was UPT negative.      US was done today due to AUB. At her last appointment she reported heavy menses that had been present since she was 16 y/o. Patient states she previously had D&C at age 20 y/o for heavy menses but that did not help, she has tried OCP's, nexplanon, IUD's, depo injection with no improvement and it made her s/s worse.     Patient reports since her last visit symptoms have mainly remained unchanged, although she is currently on her period and bleeding is lighter than normal.     The patient has had Gardasil (2010).  She is not a smoker.      Procedure Details   The risks and benefits of the procedure and Verbal informed consent obtained. Time out: immediate members of the procedure team and patient agree to the following: correct patient, correct site, correct procedure to be performed. Yfn Schwartz MD      She was positioned in the dorsal lithotomy position and a speculum was inserted into the vagina and excellent visualization of cervix achieved, cervix swabbed x 3 with acetic acid solution. The transformation zone was completely visualized.  A cervical biopsy was not obtained  An endocervical curettage was not performed.  This colposcopy was satisfactory.     Findings:  The procedure was notable for:  Physical  Exam  Vitals reviewed. Exam conducted with a chaperone present.   Constitutional:       Appearance: Normal appearance. She is obese.   HENT:      Head: Normocephalic and atraumatic.   Abdominal:      Palpations: Abdomen is soft.   Genitourinary:     General: Normal vulva.      Vagina: Normal.      Cervix: Normal.            Comments: No lesions seen on colposcopy  Skin:     General: Skin is warm and dry.   Neurological:      Mental Status: She is alert and oriented to person, place, and time.   Psychiatric:         Mood and Affect: Mood normal.         Behavior: Behavior normal.       /82   Wt (!) 152 kg (335 lb)   LMP 06/14/2025 (Exact Date)   BMI 59.36 kg/m²       Specimens: None  Specimens labelled and sent to Pathology.    Complications: none.    The patient tolerated the procedure very well     Assessment and Plan    Problem List Items Addressed This Visit    None  Visit Diagnoses         Atypical squamous cells of undetermined significance on cytologic smear of cervix (ASC-US)    -  Primary    Relevant Orders    POC Pregnancy, Urine (Completed)      Encounter for assisted reproductive fertility cycle        Relevant Orders    Ambulatory Referral to Infertility (Completed)            Will base further treatment on Pathology findings.  Treatment options discussed with patient.  Post biopsy instructions given to patient.  Repeat Pap in 12 months.    Yfn Schwartz MD  06/17/2025

## 2025-07-01 ENCOUNTER — TELEPHONE (OUTPATIENT)
Dept: OBSTETRICS AND GYNECOLOGY | Facility: CLINIC | Age: 30
End: 2025-07-01
Payer: MEDICAID

## 2025-07-01 NOTE — TELEPHONE ENCOUNTER
Provider: DR ROLAND    Caller: Bailee Lora    Relationship to Patient: Self    Phone Number: [phone]    Reason for Call: PT CALLED STATED SHE WENT TO [facility] ON 06/27; WHILE THERE THEY DID CT SCAN OF ABD/PELVIS AND A TV U/S WHERE THEY FOUND A CYST ON PT'S LEFT OVARY; PLEASE REVIEW ED PN AND IMAGING FROM 06/27.    PLEASE CALL PT TO ADVISE AND SCHEDULE.

## 2025-07-01 NOTE — TELEPHONE ENCOUNTER
Patient of Dr. Schwartz; LOV 06/17/25.  Patient was seen in [facility] ER 06/27/25; dx with UTI. Ultrasound noted left ovarian cyst with recommendation to have repeat ultrasound in 2 months.   Returned patient's call.   Appointment scheduled with ultrasound. Patient v/u and agreed.

## 2025-08-19 DIAGNOSIS — N83.202 LEFT OVARIAN CYST: Primary | ICD-10-CM

## 2025-08-22 ENCOUNTER — OFFICE VISIT (OUTPATIENT)
Dept: OBSTETRICS AND GYNECOLOGY | Facility: CLINIC | Age: 30
End: 2025-08-22
Payer: MEDICAID

## 2025-08-22 VITALS
SYSTOLIC BLOOD PRESSURE: 116 MMHG | WEIGHT: 293 LBS | HEIGHT: 63 IN | BODY MASS INDEX: 51.91 KG/M2 | RESPIRATION RATE: 18 BRPM | DIASTOLIC BLOOD PRESSURE: 80 MMHG

## 2025-08-22 DIAGNOSIS — N93.9 ABNORMAL UTERINE BLEEDING (AUB): Primary | ICD-10-CM

## 2025-08-22 RX ORDER — NORETHINDRONE 5 MG/1
5 TABLET ORAL DAILY
Qty: 30 TABLET | Refills: 5 | Status: SHIPPED | OUTPATIENT
Start: 2025-08-22 | End: 2025-09-21